# Patient Record
Sex: FEMALE | Race: WHITE | NOT HISPANIC OR LATINO | Employment: PART TIME | URBAN - METROPOLITAN AREA
[De-identification: names, ages, dates, MRNs, and addresses within clinical notes are randomized per-mention and may not be internally consistent; named-entity substitution may affect disease eponyms.]

---

## 2017-01-19 ENCOUNTER — APPOINTMENT (EMERGENCY)
Dept: RADIOLOGY | Facility: HOSPITAL | Age: 37
End: 2017-01-19
Payer: COMMERCIAL

## 2017-01-19 ENCOUNTER — HOSPITAL ENCOUNTER (EMERGENCY)
Facility: HOSPITAL | Age: 37
Discharge: HOME | End: 2017-01-19
Admitting: EMERGENCY MEDICINE
Payer: COMMERCIAL

## 2017-01-19 VITALS
HEIGHT: 68 IN | HEART RATE: 92 BPM | WEIGHT: 127 LBS | RESPIRATION RATE: 16 BRPM | SYSTOLIC BLOOD PRESSURE: 120 MMHG | TEMPERATURE: 98.8 F | OXYGEN SATURATION: 99 % | BODY MASS INDEX: 19.25 KG/M2 | DIASTOLIC BLOOD PRESSURE: 61 MMHG

## 2017-01-19 DIAGNOSIS — M25.469 SWELLING OF KNEE: Primary | ICD-10-CM

## 2017-01-19 PROCEDURE — 73560 X-RAY EXAM OF KNEE 1 OR 2: CPT

## 2017-01-19 PROCEDURE — 99283 EMERGENCY DEPT VISIT LOW MDM: CPT

## 2017-01-19 RX ORDER — NAPROXEN 500 MG/1
500 TABLET ORAL 2 TIMES DAILY WITH MEALS
Qty: 20 TABLET | Refills: 0 | Status: SHIPPED | OUTPATIENT
Start: 2017-01-19 | End: 2017-01-29

## 2017-05-04 ENCOUNTER — APPOINTMENT (EMERGENCY)
Dept: CT IMAGING | Facility: HOSPITAL | Age: 37
End: 2017-05-04
Payer: COMMERCIAL

## 2017-05-04 ENCOUNTER — HOSPITAL ENCOUNTER (EMERGENCY)
Facility: HOSPITAL | Age: 37
Discharge: HOME | End: 2017-05-05
Attending: EMERGENCY MEDICINE | Admitting: EMERGENCY MEDICINE
Payer: COMMERCIAL

## 2017-05-04 VITALS
DIASTOLIC BLOOD PRESSURE: 66 MMHG | RESPIRATION RATE: 18 BRPM | HEART RATE: 91 BPM | BODY MASS INDEX: 18.25 KG/M2 | SYSTOLIC BLOOD PRESSURE: 128 MMHG | TEMPERATURE: 97.6 F | OXYGEN SATURATION: 100 % | WEIGHT: 120 LBS

## 2017-05-04 DIAGNOSIS — M54.10 RADICULAR LOW BACK PAIN: ICD-10-CM

## 2017-05-04 DIAGNOSIS — M54.50 ACUTE LEFT-SIDED LOW BACK PAIN WITHOUT SCIATICA: Primary | ICD-10-CM

## 2017-05-04 DIAGNOSIS — R10.9 ABDOMINAL PAIN: ICD-10-CM

## 2017-05-04 LAB
BACTERIA UR QL AUTO: ABNORMAL /HPF
BILIRUB UR QL STRIP: NEGATIVE
CLARITY UR: CLEAR
COLOR UR: YELLOW
GLUCOSE UR STRIP-MCNC: NEGATIVE MG/DL
HGB UR QL STRIP.AUTO: ABNORMAL
KETONES UR STRIP-MCNC: NEGATIVE MG/DL
LEUKOCYTE ESTERASE UR QL STRIP: NEGATIVE
MUCOUS THREADS UR QL AUTO: ABNORMAL
NITRITE UR QL STRIP: NEGATIVE
NON-SQ EPI CELLS URNS QL MICRO: ABNORMAL /HPF
PH UR STRIP.AUTO: 5.5 [PH] (ref 4.5–8)
PROT UR STRIP-MCNC: NEGATIVE MG/DL
RBC #/AREA URNS AUTO: ABNORMAL /HPF
SP GR UR STRIP.AUTO: >=1.03 (ref 1–1.03)
UROBILINOGEN UR QL STRIP.AUTO: 0.2 E.U./DL
WBC #/AREA URNS AUTO: ABNORMAL /HPF

## 2017-05-04 PROCEDURE — 87086 URINE CULTURE/COLONY COUNT: CPT

## 2017-05-04 PROCEDURE — 96372 THER/PROPH/DIAG INJ SC/IM: CPT

## 2017-05-04 PROCEDURE — 74176 CT ABD & PELVIS W/O CONTRAST: CPT

## 2017-05-04 PROCEDURE — 81001 URINALYSIS AUTO W/SCOPE: CPT

## 2017-05-04 RX ORDER — KETOROLAC TROMETHAMINE 30 MG/ML
30 INJECTION, SOLUTION INTRAMUSCULAR; INTRAVENOUS ONCE
Status: COMPLETED | OUTPATIENT
Start: 2017-05-04 | End: 2017-05-04

## 2017-05-04 RX ORDER — KETOROLAC TROMETHAMINE 30 MG/ML
INJECTION, SOLUTION INTRAMUSCULAR; INTRAVENOUS
Status: COMPLETED
Start: 2017-05-04 | End: 2017-05-04

## 2017-05-04 RX ADMIN — KETOROLAC TROMETHAMINE 30 MG: 30 INJECTION, SOLUTION INTRAMUSCULAR at 23:02

## 2017-05-04 RX ADMIN — KETOROLAC TROMETHAMINE 30 MG: 30 INJECTION, SOLUTION INTRAMUSCULAR; INTRAVENOUS at 23:02

## 2017-05-05 PROCEDURE — 99284 EMERGENCY DEPT VISIT MOD MDM: CPT

## 2017-05-05 RX ORDER — METHOCARBAMOL 500 MG/1
500 TABLET, FILM COATED ORAL 3 TIMES DAILY
Qty: 20 TABLET | Refills: 0 | Status: SHIPPED | OUTPATIENT
Start: 2017-05-05 | End: 2017-08-03

## 2017-05-05 RX ORDER — NAPROXEN 500 MG/1
500 TABLET ORAL 2 TIMES DAILY WITH MEALS
Qty: 20 TABLET | Refills: 0 | Status: SHIPPED | OUTPATIENT
Start: 2017-05-05 | End: 2017-05-15

## 2017-05-05 RX ORDER — LIDOCAINE 50 MG/G
1 PATCH TOPICAL EVERY 24 HOURS
Qty: 15 PATCH | Refills: 0 | Status: SHIPPED | OUTPATIENT
Start: 2017-05-05 | End: 2017-08-03

## 2017-05-06 LAB — BACTERIA UR CULT: NORMAL

## 2017-08-03 ENCOUNTER — HOSPITAL ENCOUNTER (EMERGENCY)
Facility: HOSPITAL | Age: 37
Discharge: HOME | End: 2017-08-03
Attending: EMERGENCY MEDICINE | Admitting: EMERGENCY MEDICINE
Payer: COMMERCIAL

## 2017-08-03 VITALS
OXYGEN SATURATION: 98 % | TEMPERATURE: 97.8 F | WEIGHT: 125 LBS | HEART RATE: 63 BPM | DIASTOLIC BLOOD PRESSURE: 63 MMHG | RESPIRATION RATE: 14 BRPM | BODY MASS INDEX: 19.01 KG/M2 | SYSTOLIC BLOOD PRESSURE: 117 MMHG

## 2017-08-03 DIAGNOSIS — G43.909 MIGRAINE HEADACHE: Primary | ICD-10-CM

## 2017-08-03 LAB — HCG UR QL: NEGATIVE

## 2017-08-03 PROCEDURE — 81025 URINE PREGNANCY TEST: CPT | Performed by: EMERGENCY MEDICINE

## 2017-08-03 PROCEDURE — 99283 EMERGENCY DEPT VISIT LOW MDM: CPT

## 2017-08-03 PROCEDURE — 96372 THER/PROPH/DIAG INJ SC/IM: CPT

## 2017-08-03 RX ORDER — METOCLOPRAMIDE 10 MG/1
10 TABLET ORAL ONCE
Status: COMPLETED | OUTPATIENT
Start: 2017-08-03 | End: 2017-08-03

## 2017-08-03 RX ORDER — METOCLOPRAMIDE 10 MG/1
10 TABLET ORAL EVERY 6 HOURS PRN
Qty: 20 TABLET | Refills: 0 | Status: SHIPPED | OUTPATIENT
Start: 2017-08-03 | End: 2022-06-28

## 2017-08-03 RX ORDER — KETOROLAC TROMETHAMINE 30 MG/ML
60 INJECTION, SOLUTION INTRAMUSCULAR; INTRAVENOUS ONCE
Status: COMPLETED | OUTPATIENT
Start: 2017-08-03 | End: 2017-08-03

## 2017-08-03 RX ADMIN — METOCLOPRAMIDE HYDROCHLORIDE 10 MG: 10 TABLET ORAL at 22:40

## 2017-08-03 RX ADMIN — KETOROLAC TROMETHAMINE 60 MG: 30 INJECTION, SOLUTION INTRAMUSCULAR at 22:42

## 2017-08-03 RX ADMIN — DEXAMETHASONE SODIUM PHOSPHATE 10 MG: 10 INJECTION, SOLUTION INTRAMUSCULAR; INTRAVENOUS at 22:38

## 2018-08-15 PROBLEM — F17.200 CURRENT EVERY DAY SMOKER: Status: ACTIVE | Noted: 2018-08-15

## 2019-03-31 ENCOUNTER — OFFICE VISIT (OUTPATIENT)
Dept: URGENT CARE | Age: 39
End: 2019-03-31
Payer: COMMERCIAL

## 2019-03-31 VITALS
OXYGEN SATURATION: 96 % | WEIGHT: 148 LBS | DIASTOLIC BLOOD PRESSURE: 95 MMHG | TEMPERATURE: 97.9 F | RESPIRATION RATE: 18 BRPM | HEART RATE: 87 BPM | SYSTOLIC BLOOD PRESSURE: 122 MMHG | BODY MASS INDEX: 22.5 KG/M2

## 2019-03-31 DIAGNOSIS — J02.0 STREP THROAT: Primary | ICD-10-CM

## 2019-03-31 LAB — S PYO AG THROAT QL: NEGATIVE

## 2019-03-31 PROCEDURE — 99283 EMERGENCY DEPT VISIT LOW MDM: CPT | Performed by: PHYSICIAN ASSISTANT

## 2019-03-31 PROCEDURE — 99203 OFFICE O/P NEW LOW 30 MIN: CPT | Performed by: PHYSICIAN ASSISTANT

## 2019-03-31 PROCEDURE — 87070 CULTURE OTHR SPECIMN AEROBIC: CPT | Performed by: PHYSICIAN ASSISTANT

## 2019-03-31 PROCEDURE — 87430 STREP A AG IA: CPT | Performed by: PHYSICIAN ASSISTANT

## 2019-03-31 PROCEDURE — G0382 LEV 3 HOSP TYPE B ED VISIT: HCPCS | Performed by: PHYSICIAN ASSISTANT

## 2019-03-31 RX ORDER — AZITHROMYCIN 250 MG/1
TABLET, FILM COATED ORAL
Qty: 6 TABLET | Refills: 0 | Status: SHIPPED | OUTPATIENT
Start: 2019-03-31 | End: 2019-04-04

## 2019-03-31 RX ORDER — PAROXETINE HYDROCHLORIDE 20 MG/1
TABLET, FILM COATED ORAL
Refills: 1 | COMMUNITY
Start: 2019-03-20

## 2019-03-31 RX ORDER — MIRTAZAPINE 15 MG/1
TABLET, FILM COATED ORAL
Refills: 1 | COMMUNITY
Start: 2019-03-20

## 2019-03-31 NOTE — PATIENT INSTRUCTIONS
Take medications as directed  Drink plenty of fluids  Follow up with family doctor this week  Go to ER immediately if new or worsening symptoms occur  Strep Throat   WHAT YOU NEED TO KNOW:   Strep throat is a throat infection caused by bacteria  It is easily spread from person to person  DISCHARGE INSTRUCTIONS:   Call 911 for any of the following:   · You have trouble breathing  Return to the emergency department if:   · You have new symptoms like a bad headache, stiff neck, chest pain, or vomiting  · You are drooling because you cannot swallow your spit  Contact your healthcare provider if:   · You have a fever  · You have a rash or ear pain  · You have green, yellow-brown, or bloody mucus when you cough or blow your nose  · You are unable to drink anything  · You have questions or concerns about your condition or care  Medicines:   · Antibiotics  help treat your strep throat  You should feel better within 2 to 3 days after you start antibiotics  · Take your medicine as directed  Contact your healthcare provider if you think your medicine is not helping or if you have side effects  Tell him or her if you are allergic to any medicine  Keep a list of the medicines, vitamins, and herbs you take  Include the amounts, and when and why you take them  Bring the list or the pill bottles to follow-up visits  Carry your medicine list with you in case of an emergency  Manage your symptoms:   · Use lozenges, ice, soft foods, or popsicles  to soothe your throat  · Drink juice, milk shakes, or soup  if your throat is too sore to eat solid food  Drinking liquids can also help prevent dehydration  · Gargle with salt water  Mix ¼ teaspoon salt in a glass of warm water and gargle  This may help reduce swelling in your throat  · Do not smoke  Nicotine and other chemicals in cigarettes and cigars can cause lung damage and make your symptoms worse   Ask your healthcare provider for information if you currently smoke and need help to quit  E-cigarettes or smokeless tobacco still contain nicotine  Talk to your healthcare provider before you use these products  Return to work or school  24 hours after you start antibiotic medicine  Prevent the spread of strep throat:   · Wash your hands often  Use soap and water  Wash your hands after you use the bathroom, change a child's diapers, or sneeze  Wash your hands before you prepare or eat food  · Do not share food or drinks  Replace your toothbrush after you have taken antibiotics for 24 hours  Follow up with your healthcare provider as directed:  Write down your questions so you remember to ask them during your visits  © 2017 [employer] Information is for End User's use only and may not be sold, redistributed or otherwise used for commercial purposes  All illustrations and images included in CareNotes® are the copyrighted property of [employer]  or David Khanna  The above information is an  only  It is not intended as medical advice for individual conditions or treatments  Talk to your doctor, nurse or pharmacist before following any medical regimen to see if it is safe and effective for you

## 2019-03-31 NOTE — PROGRESS NOTES
[facility]        NAME: Laura Crum is a 35 y o  female  : 1980    MRN: [id number]  DATE: 2019  TIME: 3:56 PM    Assessment and Plan   Strep throat [J02 0]  1  Strep throat  POCT rapid strepA    Throat culture    azithromycin (ZITHROMAX) 250 mg tablet         Patient Instructions       Take medications as directed  Drink plenty of fluids  Follow up with family doctor this week  Go to ER immediately if new or worsening symptoms occur  Chief Complaint     Chief Complaint   Patient presents with    Sore Throat      couple of weeks; no fevers         History of Present Illness       Sore Throat    This is a new problem  Episode onset: Started 3 weeks ago, however in the past 2-3 days it has significantly worsened  Patient thought it was allergies at 1st, however now the pain is very bad  The problem has been gradually worsening  There has been no fever  The pain is at a severity of 7/10  Associated symptoms include swollen glands  Pertinent negatives include no congestion, coughing, diarrhea, ear pain, headaches, hoarse voice, neck pain, shortness of breath, stridor, trouble swallowing or vomiting  She has had no exposure to strep or mono  Treatments tried: honey and lemon  Review of Systems   Review of Systems   Constitutional: Negative for chills, diaphoresis, fatigue and fever  HENT: Positive for postnasal drip, sinus pressure and sore throat  Negative for congestion, ear pain, hoarse voice, rhinorrhea, sinus pain, sneezing, trouble swallowing and voice change  Eyes: Negative  Respiratory: Negative for cough, chest tightness, shortness of breath, wheezing and stridor  Cardiovascular: Negative for chest pain and palpitations  Gastrointestinal: Negative for constipation, diarrhea, nausea and vomiting  Endocrine: Negative  Genitourinary: Negative for dysuria  Musculoskeletal: Negative for back pain, myalgias and neck pain     Skin: Negative for pallor and rash  Allergic/Immunologic: Negative  Neurological: Negative for dizziness, syncope and headaches  Hematological: Negative  Psychiatric/Behavioral: Negative  Current Medications       Current Outpatient Medications:     azithromycin (ZITHROMAX) 250 mg tablet, Take 2 tablets today then 1 tablet daily x 4 days, Disp: 6 tablet, Rfl: 0    metoclopramide (REGLAN) 10 mg tablet, Take 1 tablet by mouth every 6 (six) hours as needed (headache, nausea) (Patient not taking: Reported on 3/31/2019), Disp: 20 tablet, Rfl: 0    mirtazapine (REMERON) 15 mg tablet, , Disp: , Rfl: 1    naproxen (NAPROSYN) 500 mg tablet, Take 1 tablet by mouth 2 (two) times a day with meals for 10 days, Disp: 20 tablet, Rfl: 0    PARoxetine (PAXIL) 20 mg tablet, , Disp: , Rfl: 1    Current Allergies     Allergies as of 03/31/2019 - Reviewed 03/31/2019   Allergen Reaction Noted    Penicillins Rash 03/30/2016            The following portions of the patient's history were reviewed and updated as appropriate: allergies, current medications, past family history, past medical history, past social history, past surgical history and problem list      Past Medical History:   Diagnosis Date    Cigarette smoker     [facility]     [facility] mellitus [facility]     Graves disease     Graves disease     Hypertension        Past Surgical History:   Procedure Laterality Date    TUBAL LIGATION         History reviewed  No pertinent family history  Medications have been verified  Objective   /95   Pulse 87   Temp 97 9 °F (36 6 °C)   Resp 18   Wt 67 1 kg (148 lb)   LMP 02/21/2019   SpO2 96%   BMI 22 50 kg/m²        Physical Exam     Physical Exam   Constitutional: She appears well-developed and well-nourished  No distress  HENT:   Head: Normocephalic and atraumatic     Right Ear: Hearing, tympanic membrane, external ear and ear canal normal    Left Ear: Hearing, tympanic membrane, external ear and ear canal normal    Nose: No mucosal edema or rhinorrhea  Mouth/Throat: Oropharyngeal exudate and posterior oropharyngeal erythema present  No posterior oropharyngeal edema or tonsillar abscesses  Eyes: Conjunctivae are normal  Right eye exhibits no discharge  Left eye exhibits no discharge  Neck: Normal range of motion  Neck supple  Cardiovascular: Normal rate, regular rhythm, normal heart sounds and intact distal pulses  Pulmonary/Chest: Effort normal and breath sounds normal  No respiratory distress  She has no wheezes  She has no rales  Lymphadenopathy:     She has no cervical adenopathy  Skin: Skin is warm  No rash noted  She is not diaphoretic  Nursing note and vitals reviewed

## 2019-04-02 LAB — BACTERIA THROAT CULT: NORMAL

## 2022-01-08 ENCOUNTER — APPOINTMENT (EMERGENCY)
Dept: RADIOLOGY | Facility: HOSPITAL | Age: 42
End: 2022-01-08
Payer: COMMERCIAL

## 2022-01-08 ENCOUNTER — HOSPITAL ENCOUNTER (EMERGENCY)
Facility: HOSPITAL | Age: 42
Discharge: HOME | End: 2022-01-08
Attending: EMERGENCY MEDICINE | Admitting: EMERGENCY MEDICINE
Payer: COMMERCIAL

## 2022-01-08 VITALS
OXYGEN SATURATION: 97 % | DIASTOLIC BLOOD PRESSURE: 69 MMHG | TEMPERATURE: 98.5 F | SYSTOLIC BLOOD PRESSURE: 144 MMHG | HEART RATE: 96 BPM | RESPIRATION RATE: 16 BRPM

## 2022-01-08 DIAGNOSIS — S83.92XA LEFT KNEE SPRAIN: Primary | ICD-10-CM

## 2022-01-08 PROCEDURE — 99283 EMERGENCY DEPT VISIT LOW MDM: CPT

## 2022-01-08 PROCEDURE — 73564 X-RAY EXAM KNEE 4 OR MORE: CPT

## 2022-01-08 PROCEDURE — 99285 EMERGENCY DEPT VISIT HI MDM: CPT | Performed by: PHYSICIAN ASSISTANT

## 2022-01-08 RX ORDER — NAPROXEN 500 MG/1
500 TABLET ORAL 2 TIMES DAILY WITH MEALS
Qty: 14 TABLET | Refills: 0 | Status: SHIPPED | OUTPATIENT
Start: 2022-01-08 | End: 2022-06-28

## 2022-01-09 NOTE — ED PROVIDER NOTES
History  Chief Complaint   Patient presents with    Knee Pain     Pt presents to the ED s/p fall onset x 1 day  Reports stepping down off a chair when she twisted and fell onto her left knee  Denies prior surgical hx to affected knee  History provided by:  Patient  Knee Pain  Location:  Knee  Injury: yes    Mechanism of injury: fall    Fall:     Fall occurred: fwll from chair  Impact surface:  Hard floor    Point of impact: left knee  Entrapped after fall: no    Knee location:  L knee  Pain details:     Quality:  Aching    Radiates to:  Does not radiate    Severity:  Moderate    Onset quality:  Sudden    Duration:  1 day  Chronicity:  New  Dislocation: no    Prior injury to area:  No  Relieved by:  Rest  Worsened by:  Bearing weight  Ineffective treatments:  None tried  Associated symptoms: decreased ROM    Associated symptoms: no back pain, no fatigue, no fever, no itching, no muscle weakness, no neck pain, no numbness, no stiffness, no swelling and no tingling    Risk factors: no concern for non-accidental trauma, no frequent fractures, no known bone disorder, no obesity and no recent illness        Prior to Admission Medications   Prescriptions Last Dose Informant Patient Reported? Taking? PARoxetine (PAXIL) 20 mg tablet   Yes No   metoclopramide (REGLAN) 10 mg tablet   No No   Sig: Take 1 tablet by mouth every 6 (six) hours as needed (headache, nausea)   Patient not taking: Reported on 3/31/2019   mirtazapine (REMERON) 15 mg tablet   Yes No   naproxen (NAPROSYN) 500 mg tablet   No No   Sig: Take 1 tablet by mouth 2 (two) times a day with meals for 10 days      Facility-Administered Medications: None       Past Medical History:   Diagnosis Date    Cigarette smoker     [facility]     [facility]     Graves disease     Graves disease     Hypertension        Past Surgical History:   Procedure Laterality Date    TUBAL LIGATION         History reviewed   No pertinent family history  I have reviewed and agree with the history as documented  E-Cigarette/Vaping     E-Cigarette/Vaping Substances     Social History     Tobacco Use    Smoking status: Current Every Day Smoker     Packs/day: 0 50    Smokeless tobacco: Never Used   Substance Use Topics    Alcohol use: No     Comment: socially    Drug use: No       Review of Systems   Constitutional: Negative for activity change, appetite change, fatigue and fever  Musculoskeletal: Positive for arthralgias, gait problem and joint swelling  Negative for back pain, neck pain and stiffness  Skin: Negative for color change, itching and wound  All other systems reviewed and are negative  Physical Exam  Physical Exam  Vitals and nursing note reviewed  Constitutional:       General: She is not in acute distress  Appearance: Normal appearance  She is normal weight  She is not ill-appearing, toxic-appearing or diaphoretic  HENT:      Head: Normocephalic and atraumatic  Eyes:      General:         Right eye: No discharge  Left eye: No discharge  Conjunctiva/sclera: Conjunctivae normal    Pulmonary:      Effort: Pulmonary effort is normal    Musculoskeletal:      Cervical back: Neck supple  Comments: Inspection of the left knee-it is atraumatic upon inspection  The legs are aligned  There is no tenderness with ballottement of the patella  There is negative patellar grind and inhibition sign  There is some tenderness palpated over the medial retinaculum  There is no point medial joint line tenderness present  Collateral ligaments are intact  No instability of the medial collateral ligament  There is negative Lachman's sign  There is good range of motion of the left hip in all planes   Skin:     General: Skin is warm  Capillary Refill: Capillary refill takes less than 2 seconds  Neurological:      Mental Status: She is alert     Psychiatric:         Mood and Affect: Mood normal  Behavior: Behavior normal          Thought Content: Thought content normal          Judgment: Judgment normal          Vital Signs  ED Triage Vitals [01/08/22 1832]   Temperature Pulse Respirations Blood Pressure SpO2   98 5 °F (36 9 °C) 96 16 144/69 97 %      Temp Source Heart Rate Source Patient Position - Orthostatic VS BP Location FiO2 (%)   Oral Monitor Sitting Left arm --      Pain Score       6           Vitals:    01/08/22 1832   BP: 144/69   Pulse: 96   Patient Position - Orthostatic VS: Sitting         Visual Acuity      ED Medications  Medications - No data to display    Diagnostic Studies  Results Reviewed     None                 XR knee 4+ vw left injury   ED Interpretation by Nan Chatman PA-C (01/08 1929)   No fracture                 Procedures  Procedures         ED Course                                             MDM  Number of Diagnoses or Management Options  Left knee sprain: new and requires workup  Diagnosis management comments: Medical decision making-    Patient presented to the emergency room 1 day after falling off a chair in injuring her left knee  She landed directly on her left knee  She complains of pain over the medial aspect of her knee  The pain is worse with ambulation  She is not having difficulty flexing or extending her knee  She denies any popping, locking, giving way  She was seen and examined -the knee was atraumatic upon inspection  There is tenderness palpated over the medial retinaculum  There was no tenderness with ballottement of the patella  The patient and negative patellar grind and inhibition sign  Her collateral ligaments were intact  She had a negative Lachman's sign  It    X-ray of the left knee demonstrated no acute bony abnormality  Impression-acute sprain left knee    Plan-the patient was given crutches to assist her with ambulation  She was given an orthopedic referral for follow-up    She was given a prescription for Naprosyn to take 500 mg twice daily as needed for pain  She was dispense 14 tablets  She is to take the medication with food  Amount and/or Complexity of Data Reviewed  Tests in the radiology section of CPT®: ordered and reviewed  Tests in the medicine section of CPT®: ordered and reviewed    Risk of Complications, Morbidity, and/or Mortality  Presenting problems: moderate  Diagnostic procedures: moderate  Management options: moderate    Patient Progress  Patient progress: stable      Disposition  Final diagnoses:   Left knee sprain     Time reflects when diagnosis was documented in both MDM as applicable and the Disposition within this note     Time User Action Codes Description Comment    1/8/2022  7:30 PM Lisbet Cates Add Lorene Fine  92XA] Left knee sprain       ED Disposition     ED Disposition Condition Date/Time Comment    Discharge Stable Sat Jan 8, 2022  7:30 PM Winter Younger discharge to home/self care  Follow-up Information     Follow up With Specialties Details Why [address], MD Orthopedic Surgery   [address] [facility] [address] [phone]            Discharge Medication List as of 1/8/2022  7:33 PM      CONTINUE these medications which have CHANGED    Details   naproxen (NAPROSYN) 500 mg tablet Take 1 tablet (500 mg total) by mouth 2 (two) times a day with meals, Starting Sat 1/8/2022, Normal         CONTINUE these medications which have NOT CHANGED    Details   metoclopramide (REGLAN) 10 mg tablet Take 1 tablet by mouth every 6 (six) hours as needed (headache, nausea), Starting Thu 8/3/2017, Print      mirtazapine (REMERON) 15 mg tablet Starting Wed 3/20/2019, Historical Med      PARoxetine (PAXIL) 20 mg tablet Starting Wed 3/20/2019, Historical Med             No discharge procedures on file      PDMP Review     None          ED Provider  Electronically Signed by           Leona Talamantes PA-C  01/08/22 2044

## 2022-06-23 ENCOUNTER — HOSPITAL ENCOUNTER (EMERGENCY)
Facility: HOSPITAL | Age: 42
Discharge: HOME | End: 2022-06-24
Attending: EMERGENCY MEDICINE
Payer: COMMERCIAL

## 2022-06-23 ENCOUNTER — APPOINTMENT (EMERGENCY)
Dept: CT IMAGING | Facility: HOSPITAL | Age: 42
End: 2022-06-23
Payer: COMMERCIAL

## 2022-06-23 DIAGNOSIS — N83.209 OVARIAN CYST: Primary | ICD-10-CM

## 2022-06-23 LAB
ALBUMIN SERPL BCP-MCNC: 3.8 G/DL (ref 3.5–5)
ALP SERPL-CCNC: 65 U/L (ref 34–104)
ALT SERPL W P-5'-P-CCNC: 21 U/L (ref 7–52)
ANION GAP SERPL CALCULATED.3IONS-SCNC: 8 MMOL/L (ref 4–13)
AST SERPL W P-5'-P-CCNC: 17 U/L (ref 13–39)
BASOPHILS # BLD AUTO: 0.02 THOUSANDS/ΜL (ref 0–0.1)
BASOPHILS NFR BLD AUTO: 0 % (ref 0–1)
BILIRUB SERPL-MCNC: 0.23 MG/DL (ref 0.2–1)
BUN SERPL-MCNC: 11 MG/DL (ref 5–25)
CALCIUM SERPL-MCNC: 9.4 MG/DL (ref 8.4–10.2)
CHLORIDE SERPL-SCNC: 103 MMOL/L (ref 96–108)
CO2 SERPL-SCNC: 25 MMOL/L (ref 21–32)
CREAT SERPL-MCNC: 0.74 MG/DL (ref 0.6–1.3)
EOSINOPHIL # BLD AUTO: 0.12 THOUSAND/ΜL (ref 0–0.61)
EOSINOPHIL NFR BLD AUTO: 2 % (ref 0–6)
ERYTHROCYTE [DISTWIDTH] IN BLOOD BY AUTOMATED COUNT: 13.1 % (ref 11.6–15.1)
GFR SERPL CREATININE-BSD FRML MDRD: 99 ML/MIN/1.73SQ M
GLUCOSE SERPL-MCNC: 93 MG/DL (ref 65–140)
HCT VFR BLD AUTO: 41.5 % (ref 34.8–46.1)
HGB BLD-MCNC: 14 G/DL (ref 11.5–15.4)
IMM GRANULOCYTES # BLD AUTO: 0.03 THOUSAND/UL (ref 0–0.2)
IMM GRANULOCYTES NFR BLD AUTO: 0 % (ref 0–2)
LACTATE SERPL-SCNC: 1.1 MMOL/L (ref 0.5–2)
LIPASE SERPL-CCNC: 28 U/L (ref 11–82)
LYMPHOCYTES # BLD AUTO: 2.24 THOUSANDS/ΜL (ref 0.6–4.47)
LYMPHOCYTES NFR BLD AUTO: 28 % (ref 14–44)
MCH RBC QN AUTO: 33 PG (ref 26.8–34.3)
MCHC RBC AUTO-ENTMCNC: 33.7 G/DL (ref 31.4–37.4)
MCV RBC AUTO: 98 FL (ref 82–98)
MONOCYTES # BLD AUTO: 0.56 THOUSAND/ΜL (ref 0.17–1.22)
MONOCYTES NFR BLD AUTO: 7 % (ref 4–12)
NEUTROPHILS # BLD AUTO: 4.97 THOUSANDS/ΜL (ref 1.85–7.62)
NEUTS SEG NFR BLD AUTO: 63 % (ref 43–75)
NRBC BLD AUTO-RTO: 0 /100 WBCS
PLATELET # BLD AUTO: 349 THOUSANDS/UL (ref 149–390)
PMV BLD AUTO: 9.8 FL (ref 8.9–12.7)
POTASSIUM SERPL-SCNC: 3.9 MMOL/L (ref 3.5–5.3)
PROT SERPL-MCNC: 6.5 G/DL (ref 6.4–8.4)
RBC # BLD AUTO: 4.24 MILLION/UL (ref 3.81–5.12)
SODIUM SERPL-SCNC: 136 MMOL/L (ref 135–147)
WBC # BLD AUTO: 7.94 THOUSAND/UL (ref 4.31–10.16)

## 2022-06-23 PROCEDURE — 87086 URINE CULTURE/COLONY COUNT: CPT | Performed by: PHYSICIAN ASSISTANT

## 2022-06-23 PROCEDURE — 81001 URINALYSIS AUTO W/SCOPE: CPT | Performed by: PHYSICIAN ASSISTANT

## 2022-06-23 PROCEDURE — 99284 EMERGENCY DEPT VISIT MOD MDM: CPT | Performed by: PHYSICIAN ASSISTANT

## 2022-06-23 PROCEDURE — 83690 ASSAY OF LIPASE: CPT | Performed by: PHYSICIAN ASSISTANT

## 2022-06-23 PROCEDURE — 36415 COLL VENOUS BLD VENIPUNCTURE: CPT | Performed by: PHYSICIAN ASSISTANT

## 2022-06-23 PROCEDURE — 87147 CULTURE TYPE IMMUNOLOGIC: CPT | Performed by: PHYSICIAN ASSISTANT

## 2022-06-23 PROCEDURE — 85025 COMPLETE CBC W/AUTO DIFF WBC: CPT | Performed by: PHYSICIAN ASSISTANT

## 2022-06-23 PROCEDURE — G1004 CDSM NDSC: HCPCS

## 2022-06-23 PROCEDURE — 83605 ASSAY OF LACTIC ACID: CPT | Performed by: PHYSICIAN ASSISTANT

## 2022-06-23 PROCEDURE — 74176 CT ABD & PELVIS W/O CONTRAST: CPT

## 2022-06-23 PROCEDURE — 99284 EMERGENCY DEPT VISIT MOD MDM: CPT

## 2022-06-23 PROCEDURE — 80053 COMPREHEN METABOLIC PANEL: CPT | Performed by: PHYSICIAN ASSISTANT

## 2022-06-24 VITALS
TEMPERATURE: 98.2 F | OXYGEN SATURATION: 97 % | DIASTOLIC BLOOD PRESSURE: 62 MMHG | SYSTOLIC BLOOD PRESSURE: 109 MMHG | HEART RATE: 58 BPM | RESPIRATION RATE: 18 BRPM

## 2022-06-24 LAB
BACTERIA UR QL AUTO: NORMAL /HPF
BILIRUB UR QL STRIP: NEGATIVE
CLARITY UR: CLEAR
COLOR UR: ABNORMAL
GLUCOSE UR STRIP-MCNC: NEGATIVE MG/DL
HGB UR QL STRIP.AUTO: ABNORMAL
KETONES UR STRIP-MCNC: NEGATIVE MG/DL
LEUKOCYTE ESTERASE UR QL STRIP: NEGATIVE
NITRITE UR QL STRIP: NEGATIVE
NON-SQ EPI CELLS URNS QL MICRO: NORMAL /HPF
PH UR STRIP.AUTO: 6.5 [PH]
PROT UR STRIP-MCNC: NEGATIVE MG/DL
RBC #/AREA URNS AUTO: NORMAL /HPF
SP GR UR STRIP.AUTO: <=1.005 (ref 1–1.03)
UROBILINOGEN UR QL STRIP.AUTO: 0.2 E.U./DL
WBC #/AREA URNS AUTO: NORMAL /HPF

## 2022-06-24 NOTE — DISCHARGE INSTRUCTIONS
Tylenol or Motrin for pain  Follow up with OBGYN for outpatient ultrasouns  Return to the ER if anything acutely changes

## 2022-06-24 NOTE — ED PROVIDER NOTES
History  Chief Complaint   Patient presents with    Post-op Problem     Patient states that since her hysterectomy on May 10th she has been getting excruciating pain in her lower abdomen  This pain is periodic and 10/10 when present  Patient did have a follow up on June 10th and was assured that all looked ok  Patient is a 36 YOF presenting to the ER for evaluation of intermittent lower abdominal pain  She states she had a hysterectomy and bilateral salpingectomy on 5/10  She states since then she has had intermittent lower abdominal pain  States it is sharp in nature  It comes and goes  It is usually relieved with having a bowel movement  However, today she did not have a BM and the pain occurred 3 times  She states it lasts a couple of seconds then resolves  No radiation of the pain  She denies any current pain  She denies vaginal bleeding, vaginal discharge, nausea, vomiting, diarrhea, chest pain, shortness of breath, or any other symptoms at this time  History provided by:  Patient   used: No        Prior to Admission Medications   Prescriptions Last Dose Informant Patient Reported? Taking? PARoxetine (PAXIL) 20 mg tablet   Yes No   metoclopramide (REGLAN) 10 mg tablet   No No   Sig: Take 1 tablet by mouth every 6 (six) hours as needed (headache, nausea)   Patient not taking: Reported on 3/31/2019   mirtazapine (REMERON) 15 mg tablet   Yes No   naproxen (NAPROSYN) 500 mg tablet   No No   Sig: Take 1 tablet (500 mg total) by mouth 2 (two) times a day with meals      Facility-Administered Medications: None       Past Medical History:   Diagnosis Date    Cigarette smoker     [facility]     Diabetes mellitus ([facility]     Graves disease     Graves disease     Hypertension        Past Surgical History:   Procedure Laterality Date    HYSTERECTOMY      TUBAL LIGATION         History reviewed  No pertinent family history    I have reviewed and agree with the history as documented  E-Cigarette/Vaping     E-Cigarette/Vaping Substances     Social History     Tobacco Use    Smoking status: Current Every Day Smoker     Packs/day: 0 50    Smokeless tobacco: Never Used   Substance Use Topics    Alcohol use: No     Comment: socially    Drug use: No       Review of Systems   Constitutional: Negative for chills and fever  HENT: Negative for ear pain and sore throat  Eyes: Negative for pain and visual disturbance  Respiratory: Negative for cough and shortness of breath  Cardiovascular: Negative for chest pain and palpitations  Gastrointestinal: Negative for abdominal pain and vomiting  Genitourinary: Positive for pelvic pain  Negative for dysuria and hematuria  Musculoskeletal: Negative for arthralgias and back pain  Skin: Negative for color change and rash  Neurological: Negative for seizures and syncope  All other systems reviewed and are negative  Physical Exam  Physical Exam  Vitals and nursing note reviewed  Constitutional:       General: She is not in acute distress  Appearance: She is well-developed  HENT:      Head: Normocephalic and atraumatic  Eyes:      Conjunctiva/sclera: Conjunctivae normal    Cardiovascular:      Rate and Rhythm: Normal rate and regular rhythm  Heart sounds: No murmur heard  Pulmonary:      Effort: Pulmonary effort is normal  No respiratory distress  Breath sounds: Normal breath sounds  Abdominal:      Palpations: Abdomen is soft  Tenderness: There is no abdominal tenderness  Musculoskeletal:      Cervical back: Neck supple  Skin:     General: Skin is warm and dry  Neurological:      Mental Status: She is alert           Vital Signs  ED Triage Vitals [06/23/22 2212]   Temperature Pulse Respirations Blood Pressure SpO2   98 2 °F (36 8 °C) 74 18 122/75 96 %      Temp Source Heart Rate Source Patient Position - Orthostatic VS BP Location FiO2 (%)   Oral Monitor Sitting Right arm -- Pain Score       8           Vitals:    06/23/22 2212 06/24/22 0034   BP: 122/75 109/62   Pulse: 74 58   Patient Position - Orthostatic VS: Sitting Lying         ED Medications  Medications - No data to display    Diagnostic Studies  Results Reviewed     Procedure Component Value Units Date/Time    Urine Microscopic [[id number]]  (Normal) Collected: 06/23/22 2344    Lab Status: Final result Specimen: Urine, Clean Catch Updated: 06/24/22 0017     RBC, UA 0-1 /hpf      WBC, UA 0-1 /hpf      Epithelial Cells Occasional /hpf      Bacteria, UA Occasional /hpf     UA (URINE) with reflex to Scope [[id number]]  (Abnormal) Collected: 06/23/22 2344    Lab Status: Final result Specimen: Urine, Clean Catch Updated: 06/24/22 0003     Color, UA Light Yellow     Clarity, UA Clear     Specific Gravity, UA <=1 005     pH, UA 6 5     Leukocytes, UA Negative     Nitrite, UA Negative     Protein, UA Negative mg/dl      Glucose, UA Negative mg/dl      Ketones, UA Negative mg/dl      Urobilinogen, UA 0 2 E U /dl      Bilirubin, UA Negative     Occult Blood, UA Trace-lysed    Urine culture [[id number]] Collected: 06/23/22 2344    Lab Status: In process Specimen: Urine, Clean Catch Updated: 06/23/22 2359    Lactic acid, plasma [[id number]]  (Normal) Collected: 06/23/22 2300    Lab Status: Final result Specimen: Blood from Arm, Left Updated: 06/23/22 2327     LACTIC ACID 1 1 mmol/L     Narrative:      Result may be elevated if tourniquet was used during collection      Comprehensive metabolic panel [[id number]] Collected: 06/23/22 2300    Lab Status: Final result Specimen: Blood from Arm, Left Updated: 06/23/22 2326     Sodium 136 mmol/L      Potassium 3 9 mmol/L      Chloride 103 mmol/L      CO2 25 mmol/L      ANION GAP 8 mmol/L      BUN 11 mg/dL      Creatinine 0 74 mg/dL      Glucose 93 mg/dL      Calcium 9 4 mg/dL      AST 17 U/L      ALT 21 U/L      Alkaline Phosphatase 65 U/L      Total Protein 6 5 g/dL      Albumin 3 8 g/dL      Total Bilirubin 0 23 mg/dL      eGFR 99 ml/min/1 73sq m     Narrative:      National Kidney Disease Foundation guidelines for Chronic Kidney Disease (CKD):     Stage 1 with normal or high GFR (GFR > 90 mL/min/1 73 square meters)    Stage 2 Mild CKD (GFR = 60-89 mL/min/1 73 square meters)    Stage 3A Moderate CKD (GFR = 45-59 mL/min/1 73 square meters)    Stage 3B Moderate CKD (GFR = 30-44 mL/min/1 73 square meters)    Stage 4 Severe CKD (GFR = 15-29 mL/min/1 73 square meters)    Stage 5 End Stage CKD (GFR <15 mL/min/1 73 square meters)  Note: GFR calculation is accurate only with a steady state creatinine    Lipase [[id number]]  (Normal) Collected: 06/23/22 2300    Lab Status: Final result Specimen: Blood from Arm, Left Updated: 06/23/22 2326     Lipase 28 u/L     CBC and differential [[id number]] Collected: 06/23/22 2300    Lab Status: Final result Specimen: Blood from Arm, Left Updated: 06/23/22 2307     WBC 7 94 Thousand/uL      RBC 4 24 Million/uL      Hemoglobin 14 0 g/dL      Hematocrit 41 5 %      MCV 98 fL      MCH 33 0 pg      MCHC 33 7 g/dL      RDW 13 1 %      MPV 9 8 fL      Platelets 768 Thousands/uL      nRBC 0 /100 WBCs      Neutrophils Relative 63 %      Immat GRANS % 0 %      Lymphocytes Relative 28 %      Monocytes Relative 7 %      Eosinophils Relative 2 %      Basophils Relative 0 %      Neutrophils Absolute 4 97 Thousands/µL      Immature Grans Absolute 0 03 Thousand/uL      Lymphocytes Absolute 2 24 Thousands/µL      Monocytes Absolute 0 56 Thousand/µL      Eosinophils Absolute 0 12 Thousand/µL      Basophils Absolute 0 02 Thousands/µL                  CT abdomen pelvis wo contrast   Final Result by Sudheer Weems MD (06/24 0000)      No acute intra-abdominal abnormality  No free air or free fluid  5 3 cm right ovarian cyst which does not appear entirely simple  A pelvic ultrasound is recommended for further characterization  Status post hysterectomy        Normal appendix visualized  Workstation performed: [id number]                      ED Course  ED Course as of 06/24/22 0155   Thu Jun 23, 2022   2250 Per Radiology, there is a national shortage of IV contrast, all CT scans are to be ordered without contrast at this time unless otherwise indicated by radiologist for emergent situations including stroke alerts, rule out aortic dissection, trauma alerts, or high suspicion for pulmonary embolism  Will order CT without contrast at this time  Fri Jun 24, 2022   0100 Patient has remained pain free while in the ER  Spoke with OBGYN and they state patient is OK to follow up as an outpatient for the TVUS  Patient's abdomen remains soft and non tender  All labs normal  Patient was given strict return precautions               MDM  Number of Diagnoses or Management Options  Ovarian cyst: new and requires workup  Diagnosis management comments: Patient presenting with intermittent lower abdominal pain since her hysterectomy/salpingectomy on 5/10  States the pain is sharp, comes and goes  No other symptoms  Patient is currently pain free  Remained pain free while in the ER  All labs normal  CT showing a complex ovarian cyst  Spoke with OBGYN and they said she can be seen as an outpatient for this  Patient's abdomen remained soft and non tender  She was advised to call her OBGYN for the TVUS  Tylenol or motrin for pain  Patient stable for discharge at this time  Patient explained warning signs and symptoms for ovarian torsion  Return to the ER if anything acutely changes          Amount and/or Complexity of Data Reviewed  Clinical lab tests: ordered and reviewed  Tests in the radiology section of CPT®: ordered and reviewed    Patient Progress  Patient progress: stable      Disposition  Final diagnoses:   Ovarian cyst     Time reflects when diagnosis was documented in both MDM as applicable and the Disposition within this note     Time User Action Codes Description Comment 6/24/2022 12:46 AM Teodoro Jimenezrebeccamira Add [G86 570] Ovarian cyst       ED Disposition     ED Disposition   Discharge    Condition   Stable    Date/Time   Fri Jun 24, 2022  1:18 AM    Comment   Winter Younger discharge to home/self care                 Follow-up Information     Follow up With Specialties Details Why Contact Info Additional Information    ITALIA Portillo Nurse Practitioner Schedule an appointment as soon as possible for a visit   [address] [facility]  [address] [place] [address]  [phone]       [address] [facility] Obstetrics and Gynecology Schedule an appointment as soon as possible for a visit   [address] [facility] [address] [facility] [facility], [address] [facility], [address] [facility] 107 Emergency Department Emergency Medicine  If symptoms worsen [address] [facility] [address] [facility] Emergency Department, [address], [place], [address] [facility], [address] Obstetrics and Gynecology Schedule an appointment as soon as possible for a visit   [address] [facility] [address] [facility] [address]  [facility], [address] [facility], [facility]          Discharge Medication List as of 6/24/2022  1:19 AM      CONTINUE these medications which have NOT CHANGED    Details   metoclopramide (REGLAN) 10 mg tablet Take 1 tablet by mouth every 6 (six) hours as needed (headache, nausea), Starting Thu 8/3/2017, Print      mirtazapine (REMERON) 15 mg tablet Starting Wed 3/20/2019, Historical Med      naproxen (NAPROSYN) 500 mg tablet Take 1 tablet (500 mg total) by mouth 2 (two) times a day with meals, Starting Sat 1/8/2022, Normal      PARoxetine (PAXIL) 20 mg tablet Starting Wed 3/20/2019, Historical Med             No discharge procedures on file      PDMP Review       Value Time User    PDMP Reviewed  Yes 6/23/2022 10:37 PM Maicol Lares MD          ED Provider  Electronically Signed by           Cindi Maguire PA-C  06/24/22 0155

## 2022-06-26 LAB
BACTERIA UR CULT: ABNORMAL
BACTERIA UR CULT: ABNORMAL

## 2022-06-28 ENCOUNTER — OFFICE VISIT (OUTPATIENT)
Dept: OBGYN CLINIC | Facility: CLINIC | Age: 42
End: 2022-06-28
Payer: COMMERCIAL

## 2022-06-28 VITALS
WEIGHT: 198 LBS | SYSTOLIC BLOOD PRESSURE: 120 MMHG | BODY MASS INDEX: 30.01 KG/M2 | DIASTOLIC BLOOD PRESSURE: 70 MMHG | HEIGHT: 68 IN

## 2022-06-28 DIAGNOSIS — N83.201 RIGHT OVARIAN CYST: Primary | ICD-10-CM

## 2022-06-28 PROBLEM — F41.9 ANXIETY: Status: ACTIVE | Noted: 2019-01-22

## 2022-06-28 PROBLEM — F41.8 DEPRESSION WITH ANXIETY: Status: ACTIVE | Noted: 2020-10-08

## 2022-06-28 PROBLEM — R87.610 ASCUS WITH POSITIVE HIGH RISK HPV CERVICAL: Status: ACTIVE | Noted: 2021-04-15

## 2022-06-28 PROBLEM — Z72.0 TOBACCO USE: Status: ACTIVE | Noted: 2017-09-11

## 2022-06-28 PROBLEM — G25.81 RESTLESS LEGS SYNDROME: Status: ACTIVE | Noted: 2019-04-19

## 2022-06-28 PROBLEM — F32.9 REACTIVE DEPRESSION (SITUATIONAL): Status: ACTIVE | Noted: 2019-01-22

## 2022-06-28 PROBLEM — N85.00 ENDOMETRIAL HYPERPLASIA WITHOUT ATYPIA: Status: ACTIVE | Noted: 2022-03-29

## 2022-06-28 PROBLEM — F51.04 PSYCHOPHYSIOLOGICAL INSOMNIA: Status: ACTIVE | Noted: 2019-01-22

## 2022-06-28 PROBLEM — G47.52 RBD (REM BEHAVIORAL DISORDER): Status: ACTIVE | Noted: 2022-02-04

## 2022-06-28 PROBLEM — R87.810 ASCUS WITH POSITIVE HIGH RISK HPV CERVICAL: Status: ACTIVE | Noted: 2021-04-15

## 2022-06-28 PROCEDURE — 99203 OFFICE O/P NEW LOW 30 MIN: CPT | Performed by: OBSTETRICS & GYNECOLOGY

## 2022-06-28 RX ORDER — ALBUTEROL SULFATE 90 UG/1
AEROSOL, METERED RESPIRATORY (INHALATION)
COMMUNITY
Start: 2022-05-11

## 2022-06-28 RX ORDER — LANOLIN ALCOHOL/MO/W.PET/CERES
1000 CREAM (GRAM) TOPICAL DAILY
COMMUNITY
Start: 2022-05-31

## 2022-06-28 RX ORDER — PAROXETINE 30 MG/1
TABLET, FILM COATED ORAL
COMMUNITY
Start: 2022-05-04

## 2022-06-28 RX ORDER — OMEPRAZOLE 20 MG/1
20 CAPSULE, DELAYED RELEASE ORAL DAILY
COMMUNITY
Start: 2022-06-03

## 2022-06-28 RX ORDER — GABAPENTIN 100 MG/1
300 CAPSULE ORAL
COMMUNITY
Start: 2022-02-23 | End: 2023-02-23

## 2022-06-28 RX ORDER — FERROUS SULFATE 325(65) MG
1 TABLET ORAL
COMMUNITY
Start: 2022-02-04

## 2022-06-28 RX ORDER — CETIRIZINE HYDROCHLORIDE 10 MG/1
TABLET ORAL
COMMUNITY
Start: 2022-05-04

## 2022-06-28 NOTE — PROGRESS NOTES
Assessment/Plan    Diagnoses and all orders for this visit:    Right ovarian cyst  -     US pelvis complete w transvaginal; Future  -  Pain improved since ER visit        Subjective  Chief Complaint   Patient presents with    Consult     Pt is here for a consult regarding ER follow up and ovarian cyst          Winter Younger is a 36 y o  female here for a problem visit  Patient is complaining of pelvic and abdominal pain since her robotic TLH/BS on May 10th  She did see her surgeon  who did a pelvic exam at that time and told her to f/u in a year  She was seen in the ER on  and had a CT scan done that showed a 5 3cm rigth ovarian cyst  Pain had come 3 separate times on Thursday, and then she decided to go to the ER  Pain lasted about 1-2 minutes and then resolved  She did not feel the need to have a BM  She was not happy with her care at [facility], and she does not want to return there for her care  Patient Active Problem List   Diagnosis    Current every day smoker    Anxiety    ASCUS with positive high risk HPV cervical    Depression with anxiety    Reactive depression (situational)    Endometrial hyperplasia without atypia    Psychophysiological insomnia    RBD (REM behavioral disorder)    Restless legs syndrome    Tobacco use         Gynecologic History  No LMP recorded  Patient has had an implant    Last Pap:  [facility]      Obstetric History  OB History    Para Term  AB Living   2 2 1 1   2   SAB IAB Ectopic Multiple Live Births           2      # Outcome Date GA Lbr Gutierrez/2nd Weight Sex Delivery Anes PTL Lv   2   36w0d   F Vag-Spont   HAYES   1 Term     F Vag-Spont   HAYES         Past Medical History:   Diagnosis Date    Cigarette smoker     [facility]     Diabetes mellitus [facility]     Graves disease     Graves disease     Hypertension        Past Surgical History:   Procedure Laterality Date    ROBOTIC ASSISTED HYSTERECTOMY Bilateral 05/10/2022    removal of both fallopian tubes and cervix    TUBAL LIGATION           History reviewed  No pertinent family history      Social History     Socioeconomic History    Marital status: Legally      Spouse name: Not on file    Number of children: 2    Years of education: Not on file    Highest education level: Not on file   Occupational History    Occupation:    Tobacco Use    Smoking status: Current Every Day Smoker     Packs/day: 1 00     Types: Cigarettes    Smokeless tobacco: Never Used   Vaping Use    Vaping Use: Never used   Substance and Sexual Activity    Alcohol use: No    Drug use: No    Sexual activity: Yes     Partners: Male     Birth control/protection: None   Other Topics Concern    Not on file   Social History Narrative    Not on file     Social Determinants of Health     Financial Resource Strain: Not on file   Food Insecurity: Not on file   Transportation Needs: Not on file   Physical Activity: Not on file   Stress: Not on file   Social Connections: Not on file   Intimate Partner Violence: Not on file   Housing Stability: Not on file       Allergies  Penicillins    Medications    Current Outpatient Medications:     albuterol (PROVENTIL HFA,VENTOLIN HFA) 90 mcg/act inhaler, INHALE 2 PUFFS EVERY 4 HOURS AS NEEDED FOR WHEEZING OR SHORTNESS OF BREATH, Disp: , Rfl:     cetirizine (ZyrTEC) 10 mg tablet, , Disp: , Rfl:     ferrous sulfate 325 (65 Fe) mg tablet, Take 1 tablet by mouth 3 (three) times a day with meals, Disp: , Rfl:     gabapentin (NEURONTIN) 100 mg capsule, Take 300 mg by mouth, Disp: , Rfl:     omeprazole (PriLOSEC) 20 mg delayed release capsule, Take 20 mg by mouth daily, Disp: , Rfl:     PARoxetine (PAXIL) 20 mg tablet, , Disp: , Rfl: 1    PARoxetine (PAXIL) 30 mg tablet, , Disp: , Rfl:     vitamin B-12 (VITAMIN B-12) 1,000 mcg tablet, Take 1,000 mcg by mouth daily, Disp: , Rfl:     mirtazapine (REMERON) 15 mg tablet, , Disp: , Rfl: 1      Review of Systems  Review of Systems   Constitutional: Negative for chills and fever  Gastrointestinal: Positive for abdominal pain  Negative for nausea and vomiting  Genitourinary: Positive for pelvic pain and vaginal discharge (mucous green discharge 2 weeks post op )  Negative for vaginal bleeding  Objective     /70 (BP Location: Right arm, Patient Position: Sitting, Cuff Size: Large)   Ht 5' 8" (1 727 m)   Wt 89 8 kg (198 lb)   BMI 30 11 kg/m²       Physical Exam  Constitutional:       General: She is not in acute distress  Appearance: Normal appearance  She is well-developed  She is not ill-appearing  Genitourinary:      Vulva normal       Right Labia: No rash, tenderness or lesions  Left Labia: No tenderness, lesions or rash  Vaginal cuff intact  Perineal sutures intact  No vaginal bleeding, ulceration, granulation tissue or cuff induration  No vaginal prolapse present  No vaginal atrophy present  Right Adnexa: tender and full  Left Adnexa: not tender, not full and no mass present  Pulmonary:      Effort: Pulmonary effort is normal  No respiratory distress  Neurological:      General: No focal deficit present  Mental Status: She is alert and oriented to person, place, and time  Psychiatric:         Mood and Affect: Mood normal          Behavior: Behavior normal          Thought Content: Thought content normal          Judgment: Judgment normal    Vitals and nursing note reviewed

## 2022-06-30 ENCOUNTER — HOSPITAL ENCOUNTER (OUTPATIENT)
Dept: ULTRASOUND IMAGING | Facility: HOSPITAL | Age: 42
Discharge: HOME | End: 2022-06-30
Attending: OBSTETRICS & GYNECOLOGY
Payer: COMMERCIAL

## 2022-06-30 DIAGNOSIS — N83.201 RIGHT OVARIAN CYST: ICD-10-CM

## 2022-06-30 PROCEDURE — 76830 TRANSVAGINAL US NON-OB: CPT

## 2022-06-30 PROCEDURE — 76856 US EXAM PELVIC COMPLETE: CPT

## 2022-07-05 ENCOUNTER — TELEPHONE (OUTPATIENT)
Dept: OBGYN CLINIC | Facility: CLINIC | Age: 42
End: 2022-07-05

## 2022-07-05 DIAGNOSIS — N83.201 RIGHT OVARIAN CYST: Primary | ICD-10-CM

## 2022-07-05 NOTE — TELEPHONE ENCOUNTER
Margy Fam, DO  You 14 minutes ago (2:57 PM)         Can you call patient? Simple cyst on ovary, can resolve on own, recommend repeat US in 8-12 weeks       Message text

## 2022-09-02 ENCOUNTER — HOSPITAL ENCOUNTER (OUTPATIENT)
Dept: ULTRASOUND IMAGING | Facility: HOSPITAL | Age: 42
Discharge: HOME | End: 2022-09-02
Attending: OBSTETRICS & GYNECOLOGY
Payer: COMMERCIAL

## 2022-09-02 DIAGNOSIS — N83.201 RIGHT OVARIAN CYST: ICD-10-CM

## 2022-09-02 PROCEDURE — 76830 TRANSVAGINAL US NON-OB: CPT

## 2022-09-02 PROCEDURE — 76856 US EXAM PELVIC COMPLETE: CPT

## 2023-01-18 ENCOUNTER — PREP FOR PROCEDURE (OUTPATIENT)
Dept: OBGYN CLINIC | Facility: CLINIC | Age: 43
End: 2023-01-18

## 2023-01-18 ENCOUNTER — APPOINTMENT (OUTPATIENT)
Dept: RADIOLOGY | Age: 43
End: 2023-01-18

## 2023-01-18 ENCOUNTER — OFFICE VISIT (OUTPATIENT)
Dept: PODIATRY | Facility: CLINIC | Age: 43
End: 2023-01-18

## 2023-01-18 VITALS
HEART RATE: 69 BPM | WEIGHT: 198 LBS | BODY MASS INDEX: 30.01 KG/M2 | DIASTOLIC BLOOD PRESSURE: 86 MMHG | SYSTOLIC BLOOD PRESSURE: 128 MMHG | HEIGHT: 68 IN

## 2023-01-18 DIAGNOSIS — M20.11 HALLUX ABDUCTO VALGUS, RIGHT: Primary | ICD-10-CM

## 2023-01-18 DIAGNOSIS — M20.11 ACQUIRED HALLUX VALGUS OF RIGHT FOOT: Primary | ICD-10-CM

## 2023-01-18 DIAGNOSIS — M21.619 BUNION: ICD-10-CM

## 2023-01-18 DIAGNOSIS — B35.3 TINEA PEDIS OF BOTH FEET: ICD-10-CM

## 2023-01-18 RX ORDER — CLOTRIMAZOLE 1 %
CREAM (GRAM) TOPICAL 2 TIMES DAILY
Qty: 60 G | Refills: 5 | Status: SHIPPED | OUTPATIENT
Start: 2023-01-18

## 2023-01-18 NOTE — PROGRESS NOTES
Assessment/Plan:         Diagnoses and all orders for this visit:    Hallux abducto valgus, right  -     X-ray foot right 3+ views; Future  -     Cam Boot  -     Case request operating room: BUNIONECTOMY RAJI minimally invasive with great toe osteotomy; Standing  -     Case request operating room: BUNIONECTOMY RAJI minimally invasive with great toe osteotomy    Tinea pedis of both feet  -     clotrimazole (LOTRIMIN) 1 % cream; Apply topically 2 (two) times a day    Other orders  -     Incentive spirometry; Standing  -     Insert and maintain IV line; Standing  -     Void On-Call to O R ; Standing  -     Nursing Communication CHG bath, have staff wash entire body (neck down) per pre-op bathing protocol  Routine, evening prior to, and day of surgery ; Standing  -     Place sequential compression device; Standing          Diagnosis and options discussed with patient  Patient agreeable to the plan as stated below    XR discussed with patient  Moderate HAV  She has daily pain regardless of arch supports, shoe gear changes  Even sneakers cause pain  Given smoking history would recommend a less invasive correction using Arthrex MIS bunion system  Consent was signed for right MIS Raji bunionectomy and great toe osteotomy    Surgical procedure and recovery discussed as can best be predicted  Alternatives, risks, complications covered with the patient  Appropriate postop medication discussed, educated patient on narcotic medication and its risks  Patient will be sent for preoperative testing and clearance    Patient doen not need DVT prophylaxis for this procedure    We discussed the relationship between cigarette smoking, atherosclerotic disease, and its effects on the lower extremity  I emphasized the importance of smoking cessation   PAtient clearly told she cannot smoke the week before and the 6 weeks following surgery or she risks severe complications, infection, DVT          Subjective:      Patient ID: Debora Morfin is a 39 y o  female  Patient presents with right foot pain  She has had bunion deformity for years  She has had the bump for years but over the past 6 months has been killing her  The right is worse than the left  Some shoes are ok, some shoes cause severe pain  Sneakers make it hurt like severely  She takes gabapentin for restless leg syndrome  She smokes 1ppd  She also reports a rash on her feet  IT itches  Lotrimin helps but it isn't fully going away      The following portions of the patient's history were reviewed and updated as appropriate: allergies, current medications, past family history, past medical history, past social history, past surgical history and problem list     Review of Systems    Constitutional: Negative  HENT: Negative for sinus pressure and sinus pain  Respiratory: Negative for cough and shortness of breath  Cardiovascular: Negative for chest pain and leg swelling  Gastrointestinal: Negative for diarrhea, nausea and vomiting  Musculoskeletal: foot pain/deformity   Skin: Negative for rash or wound  Neurological: Negative for weakness, numbness and headaches  Psychiatric/Behavioral: The patient is not nervous/anxious  Objective:      /86   Pulse 69   Ht 5' 8" (1 727 m)   Wt 89 8 kg (198 lb)   BMI 30 11 kg/m²          Physical Exam    Vitals reviewed  Constitutional:       Appearance: Patient is not ill-appearing or diaphoretic  Patient is overweight  HENT:      Nose: No congestion or rhinorrhea  Cardiovascular:        Dorsalis pedis pulses are 2+ on the right side and 2+ on the left side  Posterior tibial pulses are 2+ on the right side and 2+ on the left side  Pulmonary:      Effort: Pulmonary effort is normal  No respiratory distress  Musculoskeletal:      Right lower leg: No edema  Left lower leg: No edema  No calf pain with compression           Right foot:      Normal range of motion to ankle, STJ, midfoot  Deformity: Moderate HAV, hallux is tracking laterally  Large medial eminence with TTP     Pain: 1st MTPJ  No crepitus with ROM     Strength (MMT)   Achilles 5/5   PT  5/5   Peroneal 5/5   TA  5/5   EHL/EDL 5/5     Left foot:      Normal range of motion to ankle, STJ, midfoot  Deformity: mild HAV     Pain: none     Strength (MMT)   Achilles 5/5   PT  5/5   Peroneal 5/5   TA  5/5   EHL/EDL 5/5  Skin:     Capillary Refill: Capillary refill takes less than 2 seconds  Findings: No bruising, erythema, lesion or rash  Neurological:      Mental Status: Alert and oriented to person, place, and time  Gait: Gait normal        Right Protective Sensation: 10 sites tested  10 sites sensed  Left  Protective Sensation: 10 sites tested  10 sites sensed  Psychiatric:         Mood and Affect: Mood normal              XRay 3 views of the right foot  personally read by Dr Silviano Amaya in office today and discussed with patient:  1  IM angle 14 degrees (moderate deformity)  2  Sesamoid position 5/6  3  Minimal elevatus  4  MTPJ joint appears to have good cartilage  5   No acute findings

## 2023-01-18 NOTE — LETTER
January 19, 2023     Mike Munoz [address]  [address] [facility]    Patient: Kya Morfin   YOB: 1980   Date of Visit: 1/18/2023       Dear Dr Jeff Nelson: Thank you for referring Debora Morfin to me for evaluation  Below are my notes for this consultation  If you have questions, please do not hesitate to call me  I look forward to following your patient along with you  Sincerely,        Vikas Malhotra DPM        CC: No Recipients  JUANY Kat [facility]  1/19/2023  7:19 AM  Signed  Assessment/Plan:        Diagnoses and all orders for this visit:    Hallux abducto valgus, right  -     X-ray foot right 3+ views; Future  -     Cam Boot  -     Case request operating room: BUNIONECTOMY RAJI minimally invasive with great toe osteotomy; Standing  -     Case request operating room: BUNIONECTOMY RAJI minimally invasive with great toe osteotomy    Tinea pedis of both feet  -     clotrimazole (LOTRIMIN) 1 % cream; Apply topically 2 (two) times a day    Other orders  -     Incentive spirometry; Standing  -     Insert and maintain IV line; Standing  -     Void On-Call to O R ; Standing  -     Nursing Communication CHG bath, have staff wash entire body (neck down) per pre-op bathing protocol  Routine, evening prior to, and day of surgery ; Standing  -     Place sequential compression device; Standing         Diagnosis and options discussed with patient  Patient agreeable to the plan as stated below    XR discussed with patient  Moderate HAV  She has daily pain regardless of arch supports, shoe gear changes  Even sneakers cause pain  Given smoking history would recommend a less invasive correction using [employer] MIS bunion system  Consent was signed for right MIS Raji bunionectomy and great toe osteotomy    Surgical procedure and recovery discussed as can best be predicted    Alternatives, risks, complications covered with the patient  Appropriate postop medication discussed, educated patient on narcotic medication and its risks  Patient will be sent for preoperative testing and clearance    Patient doen not need DVT prophylaxis for this procedure    We discussed the relationship between cigarette smoking, atherosclerotic disease, and its effects on the lower extremity  I emphasized the importance of smoking cessation  PAtient clearly told she cannot smoke the week before and the 6 weeks following surgery or she risks severe complications, infection, DVT          Subjective:     Patient ID: Winter Navjot is a 39 y o  female  Patient presents with right foot pain  She has had bunion deformity for years  She has had the bump for years but over the past 6 months has been killing her  The right is worse than the left  Some shoes are ok, some shoes cause severe pain  Sneakers make it hurt like severely  She takes gabapentin for restless leg syndrome  She smokes 1ppd  She also reports a rash on her feet  IT itches  Lotrimin helps but it isn't fully going away      The following portions of the patient's history were reviewed and updated as appropriate: allergies, current medications, past family history, past medical history, past social history, past surgical history and problem list     Review of Systems   Constitutional: Negative  HENT: Negative for sinus pressure and sinus pain  Respiratory: Negative for cough and shortness of breath  Cardiovascular: Negative for chest pain and leg swelling  Gastrointestinal: Negative for diarrhea, nausea and vomiting  Musculoskeletal: foot pain/deformity   Skin: Negative for rash or wound  Neurological: Negative for weakness, numbness and headaches  Psychiatric/Behavioral: The patient is not nervous/anxious  Objective:      /86   Pulse 69   Ht 5' 8" (1 727 m)   Wt 89 8 kg (198 lb)   BMI 30 11 kg/m²         Physical Exam   Vitals reviewed     Constitutional: Appearance: Patient is not ill-appearing or diaphoretic  Patient is overweight  HENT:      Nose: No congestion or rhinorrhea  Cardiovascular:        Dorsalis pedis pulses are 2+ on the right side and 2+ on the left side  Posterior tibial pulses are 2+ on the right side and 2+ on the left side  Pulmonary:      Effort: Pulmonary effort is normal  No respiratory distress  Musculoskeletal:      Right lower leg: No edema  Left lower leg: No edema  No calf pain with compression           Right foot:      Normal range of motion to ankle, STJ, midfoot  Deformity: Moderate HAV, hallux is tracking laterally  Large medial eminence with TTP     Pain: 1st MTPJ  No crepitus with ROM     Strength (MMT)   Achilles 5/5   PT  5/5   Peroneal 5/5   TA  5/5   EHL/EDL 5/5     Left foot:      Normal range of motion to ankle, STJ, midfoot  Deformity: mild HAV     Pain: none     Strength (MMT)   Achilles 5/5   PT  5/5   Peroneal 5/5   TA  5/5   EHL/EDL 5/5  Skin:     Capillary Refill: Capillary refill takes less than 2 seconds  Findings: No bruising, erythema, lesion or rash  Neurological:      Mental Status: Alert and oriented to person, place, and time  Gait: Gait normal        Right Protective Sensation: 10 sites tested  10 sites sensed  Left  Protective Sensation: 10 sites tested  10 sites sensed  Psychiatric:         Mood and Affect: Mood normal              XRay 3 views of the right foot  personally read by Dr Sumi Arrington in office today and discussed with patient:  1  IM angle 14 degrees (moderate deformity)  2  Sesamoid position 5/6  3  Minimal elevatus  4  MTPJ joint appears to have good cartilage  5   No acute findings

## 2023-01-26 ENCOUNTER — ANESTHESIA EVENT (OUTPATIENT)
Dept: PERIOP | Facility: AMBULARY SURGERY CENTER | Age: 43
End: 2023-01-26

## 2023-01-29 ENCOUNTER — APPOINTMENT (OUTPATIENT)
Dept: LAB | Age: 43
End: 2023-01-29

## 2023-01-29 DIAGNOSIS — M20.11 ACQUIRED HALLUX VALGUS OF RIGHT FOOT: ICD-10-CM

## 2023-01-29 LAB
ANION GAP SERPL CALCULATED.3IONS-SCNC: 3 MMOL/L (ref 4–13)
BASOPHILS # BLD AUTO: 0.03 THOUSANDS/ÂΜL (ref 0–0.1)
BASOPHILS NFR BLD AUTO: 1 % (ref 0–1)
BUN SERPL-MCNC: 13 MG/DL (ref 5–25)
CALCIUM SERPL-MCNC: 9.1 MG/DL (ref 8.3–10.1)
CHLORIDE SERPL-SCNC: 106 MMOL/L (ref 96–108)
CO2 SERPL-SCNC: 30 MMOL/L (ref 21–32)
CREAT SERPL-MCNC: 0.88 MG/DL (ref 0.6–1.3)
EOSINOPHIL # BLD AUTO: 0.07 THOUSAND/ÂΜL (ref 0–0.61)
EOSINOPHIL NFR BLD AUTO: 2 % (ref 0–6)
ERYTHROCYTE [DISTWIDTH] IN BLOOD BY AUTOMATED COUNT: 12.4 % (ref 11.6–15.1)
GFR SERPL CREATININE-BSD FRML MDRD: 80 ML/MIN/1.73SQ M
GLUCOSE P FAST SERPL-MCNC: 85 MG/DL (ref 65–99)
HCT VFR BLD AUTO: 45.3 % (ref 34.8–46.1)
HGB BLD-MCNC: 14.9 G/DL (ref 11.5–15.4)
IMM GRANULOCYTES # BLD AUTO: 0.01 THOUSAND/UL (ref 0–0.2)
IMM GRANULOCYTES NFR BLD AUTO: 0 % (ref 0–2)
LYMPHOCYTES # BLD AUTO: 1.52 THOUSANDS/ÂΜL (ref 0.6–4.47)
LYMPHOCYTES NFR BLD AUTO: 32 % (ref 14–44)
MCH RBC QN AUTO: 32.5 PG (ref 26.8–34.3)
MCHC RBC AUTO-ENTMCNC: 32.9 G/DL (ref 31.4–37.4)
MCV RBC AUTO: 99 FL (ref 82–98)
MONOCYTES # BLD AUTO: 0.43 THOUSAND/ÂΜL (ref 0.17–1.22)
MONOCYTES NFR BLD AUTO: 9 % (ref 4–12)
NEUTROPHILS # BLD AUTO: 2.72 THOUSANDS/ÂΜL (ref 1.85–7.62)
NEUTS SEG NFR BLD AUTO: 56 % (ref 43–75)
NRBC BLD AUTO-RTO: 0 /100 WBCS
PLATELET # BLD AUTO: 370 THOUSANDS/UL (ref 149–390)
PMV BLD AUTO: 10 FL (ref 8.9–12.7)
POTASSIUM SERPL-SCNC: 4.6 MMOL/L (ref 3.5–5.3)
RBC # BLD AUTO: 4.59 MILLION/UL (ref 3.81–5.12)
SODIUM SERPL-SCNC: 139 MMOL/L (ref 135–147)
WBC # BLD AUTO: 4.78 THOUSAND/UL (ref 4.31–10.16)

## 2023-01-31 RX ORDER — GABAPENTIN 600 MG/1
TABLET ORAL
COMMUNITY
Start: 2023-01-12

## 2023-01-31 NOTE — PRE-PROCEDURE INSTRUCTIONS
Pre-Surgery Instructions:   Medication Instructions   • albuterol (PROVENTIL HFA,VENTOLIN HFA) 90 mcg/act inhaler May use day of surgery if needed     • cetirizine (ZyrTEC) 10 mg tablet Hold day of surgery     • clotrimazole (LOTRIMIN) 1 % cream Hold day of surgery     • gabapentin (NEURONTIN) 600 MG tablet Normally takes at night  • omeprazole (PriLOSEC) 20 mg delayed release capsule Take this medication day of surgery if normally taken in the morning  My Surgical Experience    The following information was developed to assist you to prepare for your operation  What do I need to do before coming to the hospital?  • Arrange for a responsible person to drive you to and from the hospital   • Arrange care for your children at home  Children are not allowed in the recovery areas of the hospital  • Plan to wear clothing that is easy to put on and take off  If you are having shoulder surgery, wear a shirt that buttons or zippers in the front  Bathing  o Shower the evening before and the morning of your surgery with an antibacterial soap  Please refer to the Pre Op Showering Instructions for Surgery Patients Sheet   o Remove nail polish and all body piercing jewelry  o Do not shave any body part for at least 24 hours before surgery-this includes face, arms, legs and upper body  Food  o Nothing to eat or drink after midnight the night before your surgery  This includes candy and chewing gum  o Exception: If your surgery is after 12:00pm (noon), you may have clear liquids such as 7-Up®, ginger ale, apple or cranberry juice, Jell-O®, water, or clear broth until 8:00 am  o Do not drink milk or juice with pulp on the morning before surgery  o Do not drink alcohol 24 hours before surgery  Medicine  o Follow instructions you received from your surgeon about which medicines you may take on the day of surgery  o If instructed to take medicine on the morning of surgery, take pills with just a small sip of water   Call your prescribing doctor for specific infroamtion on what to do if you take insulin    What should I bring to the hospital?    Bring:  • Crutches or a walker, if you have them, for foot or knee surgery  • A list of the daily medicines, vitamins, minerals, herbals and nutritional supplements you take  Include the dosages of medicines and the time you take them each day  • Glasses, dentures or hearing aids  • Minimal clothing; you will be wearing hospital sleepwear  • Photo ID; required to verify your identity  • If you have a Living Will or Power of , bring a copy of the documents  • If you have an ostomy, bring an extra pouch and any supplies you use    Do not bring  • Medicines or inhalers  • Money, valuables or jewelry    What other information should I know about the day of surgery? • Notify your surgeons if you develop a cold, sore throat, cough, fever, rash or any other illness  • Report to the Ambulatory Surgical/Same Day Surgery Unit  • You will be instructed to stop at Registration only if you have not been pre-registered  • Inform your  fi they do not stay that they will be asked by the staff to leave a phone number where they can be reached  • Be available to be reached before surgery  In the event the operating room schedule changes, you may be asked to come in earlier or later than expected    *It is important to tell your doctor and others involved in your health care if you are taking or have been taking any non-prescription drugs, vitamins, minerals, herbals or other nutritional supplements   Any of these may interact with some food or medicines and cause a reaction

## 2023-02-02 ENCOUNTER — APPOINTMENT (OUTPATIENT)
Dept: RADIOLOGY | Facility: AMBULARY SURGERY CENTER | Age: 43
End: 2023-02-02

## 2023-02-02 ENCOUNTER — ANESTHESIA (OUTPATIENT)
Dept: PERIOP | Facility: AMBULARY SURGERY CENTER | Age: 43
End: 2023-02-02

## 2023-02-02 ENCOUNTER — HOSPITAL ENCOUNTER (OUTPATIENT)
Facility: AMBULARY SURGERY CENTER | Age: 43
Setting detail: OUTPATIENT SURGERY
Discharge: HOME | End: 2023-02-02
Attending: PODIATRIST | Admitting: PODIATRIST

## 2023-02-02 VITALS
BODY MASS INDEX: 30.01 KG/M2 | DIASTOLIC BLOOD PRESSURE: 74 MMHG | SYSTOLIC BLOOD PRESSURE: 135 MMHG | WEIGHT: 198 LBS | HEIGHT: 68 IN | HEART RATE: 76 BPM | OXYGEN SATURATION: 95 % | RESPIRATION RATE: 16 BRPM | TEMPERATURE: 97 F

## 2023-02-02 DIAGNOSIS — G89.18 POST-OPERATIVE PAIN: Primary | ICD-10-CM

## 2023-02-02 PROBLEM — M20.11 HALLUX ABDUCTOVALGUS, RIGHT: Status: ACTIVE | Noted: 2023-02-02

## 2023-02-02 PROBLEM — Z98.890 PONV (POSTOPERATIVE NAUSEA AND VOMITING): Status: ACTIVE | Noted: 2023-02-02

## 2023-02-02 PROBLEM — R11.2 PONV (POSTOPERATIVE NAUSEA AND VOMITING): Status: ACTIVE | Noted: 2023-02-02

## 2023-02-02 PROBLEM — T88.59XA DELAYED EMERGENCE FROM ANESTHESIA: Status: ACTIVE | Noted: 2023-02-02

## 2023-02-02 DEVICE — IMPLANTABLE DEVICE: Type: IMPLANTABLE DEVICE | Site: FOOT | Status: FUNCTIONAL

## 2023-02-02 RX ORDER — ALBUTEROL SULFATE 2.5 MG/3ML
2.5 SOLUTION RESPIRATORY (INHALATION) ONCE AS NEEDED
Status: DISCONTINUED | OUTPATIENT
Start: 2023-02-02 | End: 2023-02-02 | Stop reason: HOSPADM

## 2023-02-02 RX ORDER — ACETAMINOPHEN 325 MG/1
650 TABLET ORAL EVERY 4 HOURS PRN
Status: DISCONTINUED | OUTPATIENT
Start: 2023-02-02 | End: 2023-02-02 | Stop reason: HOSPADM

## 2023-02-02 RX ORDER — FENTANYL CITRATE/PF 50 MCG/ML
25 SYRINGE (ML) INJECTION
Status: DISCONTINUED | OUTPATIENT
Start: 2023-02-02 | End: 2023-02-02 | Stop reason: HOSPADM

## 2023-02-02 RX ORDER — ONDANSETRON 2 MG/ML
4 INJECTION INTRAMUSCULAR; INTRAVENOUS ONCE AS NEEDED
Status: DISCONTINUED | OUTPATIENT
Start: 2023-02-02 | End: 2023-02-02 | Stop reason: HOSPADM

## 2023-02-02 RX ORDER — HYDROMORPHONE HCL/PF 1 MG/ML
0.5 SYRINGE (ML) INJECTION
Status: DISCONTINUED | OUTPATIENT
Start: 2023-02-02 | End: 2023-02-02 | Stop reason: HOSPADM

## 2023-02-02 RX ORDER — ONDANSETRON 2 MG/ML
INJECTION INTRAMUSCULAR; INTRAVENOUS AS NEEDED
Status: DISCONTINUED | OUTPATIENT
Start: 2023-02-02 | End: 2023-02-02

## 2023-02-02 RX ORDER — FENTANYL CITRATE 50 UG/ML
INJECTION, SOLUTION INTRAMUSCULAR; INTRAVENOUS AS NEEDED
Status: DISCONTINUED | OUTPATIENT
Start: 2023-02-02 | End: 2023-02-02

## 2023-02-02 RX ORDER — DEXAMETHASONE SODIUM PHOSPHATE 10 MG/ML
INJECTION, SOLUTION INTRAMUSCULAR; INTRAVENOUS AS NEEDED
Status: DISCONTINUED | OUTPATIENT
Start: 2023-02-02 | End: 2023-02-02

## 2023-02-02 RX ORDER — SODIUM CHLORIDE, SODIUM LACTATE, POTASSIUM CHLORIDE, CALCIUM CHLORIDE 600; 310; 30; 20 MG/100ML; MG/100ML; MG/100ML; MG/100ML
INJECTION, SOLUTION INTRAVENOUS CONTINUOUS PRN
Status: DISCONTINUED | OUTPATIENT
Start: 2023-02-02 | End: 2023-02-02

## 2023-02-02 RX ORDER — ALBUTEROL SULFATE 2.5 MG/3ML
2.5 SOLUTION RESPIRATORY (INHALATION) ONCE
Status: COMPLETED | OUTPATIENT
Start: 2023-02-02 | End: 2023-02-02

## 2023-02-02 RX ORDER — HYDROMORPHONE HCL/PF 1 MG/ML
0.2 SYRINGE (ML) INJECTION
Status: DISCONTINUED | OUTPATIENT
Start: 2023-02-02 | End: 2023-02-02 | Stop reason: HOSPADM

## 2023-02-02 RX ORDER — LIDOCAINE HYDROCHLORIDE 10 MG/ML
INJECTION, SOLUTION EPIDURAL; INFILTRATION; INTRACAUDAL; PERINEURAL AS NEEDED
Status: DISCONTINUED | OUTPATIENT
Start: 2023-02-02 | End: 2023-02-02

## 2023-02-02 RX ORDER — MIDAZOLAM HYDROCHLORIDE 2 MG/2ML
INJECTION, SOLUTION INTRAMUSCULAR; INTRAVENOUS AS NEEDED
Status: DISCONTINUED | OUTPATIENT
Start: 2023-02-02 | End: 2023-02-02

## 2023-02-02 RX ORDER — PROPOFOL 10 MG/ML
INJECTION, EMULSION INTRAVENOUS CONTINUOUS PRN
Status: DISCONTINUED | OUTPATIENT
Start: 2023-02-02 | End: 2023-02-02

## 2023-02-02 RX ORDER — LABETALOL HYDROCHLORIDE 5 MG/ML
10 INJECTION, SOLUTION INTRAVENOUS
Status: DISCONTINUED | OUTPATIENT
Start: 2023-02-02 | End: 2023-02-02 | Stop reason: HOSPADM

## 2023-02-02 RX ORDER — DEXMEDETOMIDINE HYDROCHLORIDE 100 UG/ML
INJECTION, SOLUTION INTRAVENOUS AS NEEDED
Status: DISCONTINUED | OUTPATIENT
Start: 2023-02-02 | End: 2023-02-02

## 2023-02-02 RX ORDER — PROMETHAZINE HYDROCHLORIDE 25 MG/ML
12.5 INJECTION, SOLUTION INTRAMUSCULAR; INTRAVENOUS ONCE AS NEEDED
Status: DISCONTINUED | OUTPATIENT
Start: 2023-02-02 | End: 2023-02-02 | Stop reason: HOSPADM

## 2023-02-02 RX ORDER — METOCLOPRAMIDE HYDROCHLORIDE 5 MG/ML
10 INJECTION INTRAMUSCULAR; INTRAVENOUS ONCE AS NEEDED
Status: DISCONTINUED | OUTPATIENT
Start: 2023-02-02 | End: 2023-02-02 | Stop reason: HOSPADM

## 2023-02-02 RX ORDER — HYDRALAZINE HYDROCHLORIDE 20 MG/ML
5 INJECTION INTRAMUSCULAR; INTRAVENOUS
Status: DISCONTINUED | OUTPATIENT
Start: 2023-02-02 | End: 2023-02-02 | Stop reason: HOSPADM

## 2023-02-02 RX ORDER — SCOLOPAMINE TRANSDERMAL SYSTEM 1 MG/1
1 PATCH, EXTENDED RELEASE TRANSDERMAL ONCE
Status: DISCONTINUED | OUTPATIENT
Start: 2023-02-02 | End: 2023-02-02 | Stop reason: HOSPADM

## 2023-02-02 RX ORDER — CEFAZOLIN SODIUM 2 G/50ML
2000 SOLUTION INTRAVENOUS ONCE
Status: COMPLETED | OUTPATIENT
Start: 2023-02-02 | End: 2023-02-02

## 2023-02-02 RX ORDER — OXYCODONE HYDROCHLORIDE AND ACETAMINOPHEN 5; 325 MG/1; MG/1
1 TABLET ORAL EVERY 4 HOURS PRN
Qty: 18 TABLET | Refills: 0 | Status: SHIPPED | OUTPATIENT
Start: 2023-02-02 | End: 2023-02-12

## 2023-02-02 RX ORDER — SODIUM CHLORIDE, SODIUM LACTATE, POTASSIUM CHLORIDE, CALCIUM CHLORIDE 600; 310; 30; 20 MG/100ML; MG/100ML; MG/100ML; MG/100ML
125 INJECTION, SOLUTION INTRAVENOUS CONTINUOUS
Status: DISCONTINUED | OUTPATIENT
Start: 2023-02-02 | End: 2023-02-02 | Stop reason: HOSPADM

## 2023-02-02 RX ORDER — OXYCODONE HYDROCHLORIDE AND ACETAMINOPHEN 5; 325 MG/1; MG/1
1 TABLET ORAL EVERY 4 HOURS PRN
Status: DISCONTINUED | OUTPATIENT
Start: 2023-02-02 | End: 2023-02-02 | Stop reason: HOSPADM

## 2023-02-02 RX ADMIN — ONDANSETRON 4 MG: 2 INJECTION INTRAMUSCULAR; INTRAVENOUS at 13:14

## 2023-02-02 RX ADMIN — ALBUTEROL SULFATE 2.5 MG: 2.5 SOLUTION RESPIRATORY (INHALATION) at 11:16

## 2023-02-02 RX ADMIN — CEFAZOLIN SODIUM 2000 MG: 2 SOLUTION INTRAVENOUS at 12:19

## 2023-02-02 RX ADMIN — DEXAMETHASONE SODIUM PHOSPHATE 10 MG: 10 INJECTION, SOLUTION INTRAMUSCULAR; INTRAVENOUS at 12:25

## 2023-02-02 RX ADMIN — SCOPOLAMINE 1 PATCH: 1 SYSTEM TRANSDERMAL at 11:17

## 2023-02-02 RX ADMIN — FENTANYL CITRATE 50 MCG: 50 INJECTION, SOLUTION INTRAMUSCULAR; INTRAVENOUS at 13:11

## 2023-02-02 RX ADMIN — FENTANYL CITRATE 50 MCG: 50 INJECTION, SOLUTION INTRAMUSCULAR; INTRAVENOUS at 12:26

## 2023-02-02 RX ADMIN — SODIUM CHLORIDE, SODIUM LACTATE, POTASSIUM CHLORIDE, AND CALCIUM CHLORIDE: .6; .31; .03; .02 INJECTION, SOLUTION INTRAVENOUS at 12:05

## 2023-02-02 RX ADMIN — LIDOCAINE HYDROCHLORIDE 50 MG: 10 INJECTION, SOLUTION EPIDURAL; INFILTRATION; INTRACAUDAL at 12:23

## 2023-02-02 RX ADMIN — SODIUM CHLORIDE, SODIUM LACTATE, POTASSIUM CHLORIDE, AND CALCIUM CHLORIDE: .6; .31; .03; .02 INJECTION, SOLUTION INTRAVENOUS at 13:30

## 2023-02-02 RX ADMIN — MIDAZOLAM HYDROCHLORIDE 2 MG: 1 INJECTION, SOLUTION INTRAMUSCULAR; INTRAVENOUS at 12:19

## 2023-02-02 RX ADMIN — FENTANYL CITRATE 50 MCG: 50 INJECTION, SOLUTION INTRAMUSCULAR; INTRAVENOUS at 12:41

## 2023-02-02 RX ADMIN — FENTANYL CITRATE 50 MCG: 50 INJECTION, SOLUTION INTRAMUSCULAR; INTRAVENOUS at 12:23

## 2023-02-02 RX ADMIN — DEXMEDETOMIDINE HYDROCHLORIDE 8 MCG: 100 INJECTION, SOLUTION INTRAVENOUS at 12:24

## 2023-02-02 RX ADMIN — PROPOFOL 180 MCG/KG/MIN: 10 INJECTION, EMULSION INTRAVENOUS at 12:23

## 2023-02-02 NOTE — ANESTHESIA POSTPROCEDURE EVALUATION
Post-Op Assessment Note    CV Status:  Stable  Pain Score: 0    Pain management: adequate     Mental Status:  Sleepy   Hydration Status:  Euvolemic   PONV Controlled:  Controlled   Airway Patency:  Patent      Post Op Vitals Reviewed: Yes      Staff: CRNA         No notable events documented      BP   148/81   Temp  97 3   Pulse  78   Resp   12   SpO2   99

## 2023-02-02 NOTE — OP NOTE
OPERATIVE REPORT - Podiatry  PATIENT NAME: Debora Morfin    :  1980  MRN: [id number]  Pt Location: [facility] OR ROOM 06    SURGERY DATE: 2023    Surgeon(s) and Role:     * Sailaja Underwood DPM - Primary     * Yuni Corbin DPM - Assisting     * Yaniv Clifton DPM - Observing    Pre-op Diagnosis:  Hallux abducto valgus, right [M20 11]    Post-Op Diagnosis Codes:     * Hallux abducto valgus, right [M20 11]    Procedure(s) (LRB):  BUNIONECTOMY RAJI minimally invasive with great toe osteotomy (Right)    Specimen(s):  * No specimens in log *    Estimated Blood Loss:   Minimal    Drains:  * No LDAs found *    Anesthesia Type:   General/LMA with 20 ml of 1% Lidocaine and 0 5% Bupivacaine in a 1:1 mixture    Hemostasis:  -Atraumatic technique    Materials:  Implant Name Type Inv  Item Serial No   Lot No  LRB No  Used Action   4 0 X 50MM BEVELED SCREW    [employer]  Right 1 Implanted   3 5 X 30MM BEVELED SCREW    [employer]  Right 1 Implanted     -4-0 nylon    Operative Findings:  Consistent with diagnosis    Complications:   None    Procedure and Technique:     Under mild sedation, the patient was brought into the operating room and placed on the operating room table in the supine position  IV sedation was achieved by anesthesia team and a universal timeout was performed where all parties are in agreement of correct patient, correct procedure and correct site  A Paulino block was performed consisting of 20 ml of 1% Lidocaine and 0 5% Bupivacaine in a 1:1 mixture  The foot was then prepped and draped in the usual aseptic manner  Attention was then directed to the right foot  Under fluoroscopic guidance the central portion of the first metatarsal was drawn out the lateral view  On an AP view under fluoroscopy the metaphyseal region of the first metatarsal was marked out, just proximal to the sesamoid apparatus    A small stab incision was created with a 15 blade at this location, per manufactures guidelines/recommendations the bur was inserted medially at the first metatarsal, under fluoroscopy was noted to be perpendicular to the first metatarsal shaft  The bur was then utilized to enter the central portion of the first metatarsal and make a transverse cut  Per 's guidelines the MIS jig was inserted and proper lateral translation of the first metatarsal head, as well as frontal plane rotation was noted  The jig was secured in place with 1 K wire  Another K wire was inserted from proximal medial to distal lateral across the first metatarsal and into the capital fragment  Wire guides were inserted into proper locations of the jig per 's guidelines  Small stab incisions were created in order to ensure that they properly were against bone  2 K wires were then inserted parallel to 1 another starting from the base of the first metatarsal medially and going across the capital fragment, with a second K wire being just distal to this  Proper length of the wires was confirmed under fluoroscopy  Screw length was measured off of the wires  The more proximal wire was then drilled, and an appropriately sized beveled screw was inserted across the base of the first metatarsal and into the capital fragment  This same process was then performed to the more distal wire  Screw sizes are noted above in the implant section  The beveled screws were inserted so that they were flush against the medial aspect of the first metatarsal   The jig as well as the K wires were then removed  Final positioning of the osteotomy, as well as the hardware was assessed under fluoroscopy and was noted to be excellent  The incision sites were then irrigated with a bulb syringe and normal sterile saline  Small medial shelf proximal to the osteotomy at the medial side of the first metatarsal was then resected utilizing a rongeur    Skin was reapproximated with 4-0 nylon horizontal mattress and simple interrupted techniques  The foot was then cleansed and dried and the incision sites were dressed with Xeroform, 4 x 4 gauze, Kerlix, and a 4 inch Ace bandage  Normal hyperemic response was noted to all digits  The patient tolerated the procedure and anesthesia well without immediate complications and transferred to PACU with vital signs stable  Dr Jacob Love was present during the entire procedure and participated in all bennett aspects  Fransisca Oshea DPM  DATE: February 2, 2023  TIME: 1:53 PM      Portions of the record may have been created with voice recognition software  Occasional wrong word or "sound a like" substitutions may have occurred due to the inherent limitations of voice recognition software  Read the chart carefully and recognize, using context, where substitutions have occurred

## 2023-02-02 NOTE — ANESTHESIA PREPROCEDURE EVALUATION
Procedure:  BUNIONECTOMY RAJI minimally invasive with great toe osteotomy (Right: Foot)    Relevant Problems   ANESTHESIA   (+) Delayed emergence from anesthesia   (+) PONV (postoperative nausea and vomiting)      NEURO/PSYCH   (+) Anxiety   (+) Depression with anxiety      Musculoskeletal and Integument   (+) Hallux abductovalgus, right      Other   (+) Restless legs syndrome   (+) Tobacco use        Physical Exam    Airway    Mallampati score: II  TM Distance: >3 FB  Neck ROM: full     Dental   No notable dental hx     Cardiovascular  Rhythm: regular, Rate: normal,     Pulmonary  Wheezes,     Other Findings        Anesthesia Plan  ASA Score- 2     Anesthesia Type- IV sedation with anesthesia with ASA Monitors  Additional Monitors:   Airway Plan:           Plan Factors-Exercise tolerance (METS): >4 METS  Chart reviewed  Existing labs reviewed  Patient summary reviewed  Patient is a current smoker  Patient instructed to abstain from smoking on day of procedure  Patient smoked on day of surgery  Induction-     Postoperative Plan-     Informed Consent- Anesthetic plan and risks discussed with patient  I personally reviewed this patient with the CRNA  Discussed and agreed on the Anesthesia Plan with the CRNA           H&P reviewed   No significant changes

## 2023-02-02 NOTE — DISCHARGE INSTR - AVS FIRST PAGE
Dr Destin Swift Instructions    1  Take your prescribed medication as directed  2  Upon arrival at home, lie down and elevate your surgical foot on 2 pillows  3  Remain off your feet as much as possible, for the first 7 days  This is when your feet first swell and may become painful  Weightbearing as tolerated to right heel in surgical shoe  4  Drink large quantities of water and citrus fruit juice  Consume no alcohol  Continue a well-balanced diet  5  Report any unusual discomfort or fever to this office  6  A limited amount of discomfort and swelling is to be expected  In some cases the skin may take on a bruised appearance  The surgical solution that was applied to your foot prior to the operation is dark in color and the operation site may appear to be oozing when it actually is not  7  A slight amount of blood is to be expected, and is no cause for alarm  Do not remove the dressings  If there is active bleeding and if the bleeding persists, add additional gauze to the bandage, apply direct pressure, elevate your feet and call this office  8  Do not get the dressings wet  Sponge baths are recommended  9  When anesthesia wears off and if any discomfort should be present, apply an ice pack over the ankle as much as possible  Do not use hot water bags or electric pads  A convenient icepack can be made by placing ice cubes in a plastic bag and covering this with a towel  10  If necessary, take a mild laxative before retiring  11  Wear your surgical shoe at all times  12  Having performed the operation, we are interested in a prompt recovery  Please cooperate by following the above instructions  13  Please call to confirm your post-op appointment or call with any other questions

## 2023-02-02 NOTE — DISCHARGE SUMMARY
Discharge Summary Outpatient Procedure Podiatry -   Winter A Younger 39 y o  female MRN: [id number]  Unit/Bed#: OR POOL Encounter: [id number]    Admission Date: 2/2/2023     Admitting Diagnosis: Hallux abducto valgus, right [M20 11]    Discharge Diagnosis: same    Procedures Performed: Minimally invasive right 1st distal metatarsal bunionectomy, right (CPT 68646)    Complications: none    Condition at Discharge: stable    Discharge instructions/Information to patient and family:   See after visit summary for information provided to patient and family  Provisions for Follow-Up Care/Important appointments:  See after visit summary for information related to follow-up care and any pertinent home health orders  Discharge Medications:  See after visit summary for reconciled discharge medications provided to patient and family

## 2023-02-09 ENCOUNTER — OFFICE VISIT (OUTPATIENT)
Dept: PODIATRY | Facility: CLINIC | Age: 43
End: 2023-02-09

## 2023-02-09 VITALS
HEART RATE: 78 BPM | DIASTOLIC BLOOD PRESSURE: 75 MMHG | BODY MASS INDEX: 30.01 KG/M2 | HEIGHT: 68 IN | SYSTOLIC BLOOD PRESSURE: 130 MMHG | WEIGHT: 198 LBS

## 2023-02-09 DIAGNOSIS — M20.11 HALLUX ABDUCTO VALGUS, RIGHT: Primary | ICD-10-CM

## 2023-02-09 NOTE — PROGRESS NOTES
Assessment/Plan:      Diagnoses and all orders for this visit:    Hallux abducto valgus, right      Patient is stable postop 1 weeks    Incision: healed, sutures removed, steri strip applied  Instructions given to patient  Rest the foot as much as possible and elevate/ice  if swollen  Ambulatory status: Light WB in surgical shoe  She admits to not wearing it  She was told she must have it on if her foot is on the floor at ALL times  Images reviewed today: postop images discussed, no concerns    RTC: 1 week  Subjective:     Patient ID: Kelsi Purdy Younger is a 39 y o  female  DOS: 2/2/2023     Procedure: right MIS bunionectomy     Condition: Dressing C/D/I  She states she has minimal pain  She states she is "not really wearing the boot at home but I'm being careful "         Review of Systems      Objective:     Physical Exam  Vitals reviewed  Cardiovascular:      Pulses: Normal pulses  Musculoskeletal:         General: Swelling and tenderness present  Neurological:      Mental Status: She is alert  First ray correction maintained  Expected edema  Incisions healed, sutures intact   Ankle MMT normal, no calf pain

## 2023-02-16 ENCOUNTER — OFFICE VISIT (OUTPATIENT)
Dept: PODIATRY | Facility: CLINIC | Age: 43
End: 2023-02-16

## 2023-02-16 VITALS
HEART RATE: 75 BPM | WEIGHT: 198 LBS | SYSTOLIC BLOOD PRESSURE: 125 MMHG | HEIGHT: 68 IN | DIASTOLIC BLOOD PRESSURE: 78 MMHG | BODY MASS INDEX: 30.01 KG/M2

## 2023-02-16 DIAGNOSIS — M20.11 HALLUX ABDUCTO VALGUS, RIGHT: Primary | ICD-10-CM

## 2023-02-16 NOTE — PROGRESS NOTES
Assessment/Plan:      Diagnoses and all orders for this visit:    Hallux abducto valgus, right      Patient is stable postop 2 weeks    Incision: healed    Instructions given to patient  Rest the foot as much as possible and elevate/ice  if swollen  Ambulatory status: WB surgical shoe    Images reviewed today: none    RTC: 4 weeks, she is healing well  XR at that time      Subjective:     Patient ID: Pina Moreno Younger is a 39 y o  female  DOS: 2/2/2023     Procedure: right bunionectomy (MIS)     Condition: Dressing C/D/I  SHe feels achy but overall doing well        Review of Systems      Objective:     Physical Exam  Vitals reviewed  Musculoskeletal:         General: Swelling present  No deformity (first ray correction maintained  TTP but normal ROM  )  Neurological:      Mental Status: She is alert

## 2023-03-16 ENCOUNTER — OFFICE VISIT (OUTPATIENT)
Dept: PODIATRY | Facility: CLINIC | Age: 43
End: 2023-03-16

## 2023-03-16 ENCOUNTER — APPOINTMENT (OUTPATIENT)
Dept: RADIOLOGY | Age: 43
End: 2023-03-16

## 2023-03-16 VITALS
SYSTOLIC BLOOD PRESSURE: 127 MMHG | WEIGHT: 198 LBS | DIASTOLIC BLOOD PRESSURE: 82 MMHG | HEART RATE: 80 BPM | BODY MASS INDEX: 30.01 KG/M2 | HEIGHT: 68 IN

## 2023-03-16 DIAGNOSIS — M79.671 RIGHT FOOT PAIN: ICD-10-CM

## 2023-03-16 DIAGNOSIS — M20.11 HALLUX ABDUCTO VALGUS, RIGHT: Primary | ICD-10-CM

## 2023-03-16 NOTE — PROGRESS NOTES
Assessment/Plan:      Diagnoses and all orders for this visit:    Hallux abducto valgus, right    Right foot pain  -     X-ray foot right 3+ views; Future          Subjective:     Patient ID: Lorenzo Hummel Younger is a 39 y o  female  DOS: 2/2/2023     Procedure: Right foot minimally invasive bunionectomy     Condition: Dressing C/D/I  Patient states she has little to no pain  She has been walking in her boot without issues  She is 6 weeks postop        Review of Systems      Objective:     Physical Exam  Vitals reviewed  Cardiovascular:      Pulses: Normal pulses  Musculoskeletal:      Comments: First ray correction maintained to the right foot  No crepitus and normal range of motion to the first metatarsal phalangeal joint  Minimal tenderness to the surgical site  Patient able to stand barefoot and perform heel raise without significant foot pain  No calf pain   Skin:     Findings: No erythema  Neurological:      Mental Status: She is alert  XRay 3 views of the right foot personally read by Dr Chelsea Damon in office today and discussed with patient:  1  Osteotomy and hardware stable along the first ray  Correction maintained  2  Evidence of bone callus at the osteotomy site to be expected  3   No acute concerns 6 weeks postop minimally invasive bunionectomy

## 2023-03-16 NOTE — LETTER
March 16, 2023     Patient: Debora Morfin  YOB: 1980  Date of Visit: 3/16/2023      To Whom it May Concern:    Winter Navjot is under my professional care  Winter was seen in my office on 3/16/2023  Winter may return to work on 3/17/2023  She no longer needs to wear the surgical boot but I am placing her in a sneaker for 4-6 more weeks       If you have any questions or concerns, please don't hesitate to call           Sincerely,          Tigist Solano DPM        CC: No Recipients

## 2023-05-24 ENCOUNTER — APPOINTMENT (OUTPATIENT)
Dept: RADIOLOGY | Age: 43
End: 2023-05-24

## 2023-05-24 ENCOUNTER — OFFICE VISIT (OUTPATIENT)
Dept: PODIATRY | Facility: CLINIC | Age: 43
End: 2023-05-24

## 2023-05-24 VITALS — WEIGHT: 198 LBS | HEIGHT: 68 IN | BODY MASS INDEX: 30.01 KG/M2

## 2023-05-24 DIAGNOSIS — M79.671 RIGHT FOOT PAIN: ICD-10-CM

## 2023-05-24 DIAGNOSIS — M20.11 HALLUX ABDUCTO VALGUS, RIGHT: ICD-10-CM

## 2023-05-24 DIAGNOSIS — M84.374A STRESS FRACTURE OF METATARSAL BONE OF RIGHT FOOT, INITIAL ENCOUNTER: Primary | ICD-10-CM

## 2023-05-24 NOTE — PROGRESS NOTES
"Assessment/Plan:       Diagnoses and all orders for this visit:    Stress fracture of metatarsal bone of right foot, initial encounter  -     Cam Boot    Hallux abducto valgus, right  -     X-ray foot right 3+ views; Future  -     Cam Boot    Right foot pain  -     X-ray foot right 3+ views; Future  -     Cam Boot      Diagnosis and options discussed with patient  Patient agreeable to the plan as stated below    The bunion surgery is healed on XR and clinically  No first ray pain  The screw head is palpated but currently not causing pain    SHe has sharp pain and edema base of 2,3 metatarsal Suspect stress fracture  CAM boot  RICE protocol  Check in 4 weeks  If not improving, MRI  The hardware is titanium and safe for MRI    Subjective:      Patient ID: Claudeen Rm Younger is a 39 y o  female  Patient had bunion surgery last February  At 6 weeks her foot felt good  SHe began walking  She started having severe midfoot pain a month or 2 ago but just tried waiting to see if it would get better  IT is now getting worse  She poiunt to the midfoot as the source of pain  She states the bunion does not hurt  She denies trauma  The following portions of the patient's history were reviewed and updated as appropriate: allergies, current medications, past family history, past medical history, past social history, past surgical history and problem list     Review of Systems    Constitutional: Negative  Respiratory: Negative for cough and shortness of breath  Gastrointestinal: Negative for diarrhea, nausea and vomiting  Musculoskeletal: right foot pain  Skin: Negative for rash or wound  Neurological: Negative for weakness, numbness and headaches  Objective:      Ht 5' 8\" (1 727 m)   Wt 89 8 kg (198 lb)   LMP 02/21/2019   BMI 30 11 kg/m²          Physical Exam  Vitals reviewed  Constitutional:       Appearance: She is obese  She is not ill-appearing or diaphoretic     Cardiovascular:      Rate and " Rhythm: Normal rate  Pulses: Normal pulses  Pulmonary:      Effort: Pulmonary effort is normal  No respiratory distress  Musculoskeletal:         General: Tenderness (sharp pain right 2,3 met bases  No first ray pain  NOrmal first ray ROM) present  Skin:     Findings: No erythema or rash  Neurological:      Mental Status: She is alert  XRay 3 views of the right foot personally read by Dr Leonardo Barajas in office today and discussed with patient:  Recent bunion surgery is healed  Osteotomy looks healed  The screws are proud in the bone but clinically this isn't her source of pain  I don't see evidence of stress reaction or fracture to the 2,3 metatarsals  Deon Walsh

## 2023-06-21 ENCOUNTER — OFFICE VISIT (OUTPATIENT)
Dept: PODIATRY | Facility: CLINIC | Age: 43
End: 2023-06-21
Payer: COMMERCIAL

## 2023-06-21 ENCOUNTER — APPOINTMENT (OUTPATIENT)
Dept: RADIOLOGY | Age: 43
End: 2023-06-21
Payer: COMMERCIAL

## 2023-06-21 VITALS
SYSTOLIC BLOOD PRESSURE: 133 MMHG | DIASTOLIC BLOOD PRESSURE: 83 MMHG | HEART RATE: 73 BPM | HEIGHT: 68 IN | WEIGHT: 198 LBS | BODY MASS INDEX: 30.01 KG/M2

## 2023-06-21 DIAGNOSIS — M79.671 RIGHT FOOT PAIN: ICD-10-CM

## 2023-06-21 DIAGNOSIS — M19.079 ARTHRITIS OF MIDFOOT: Primary | ICD-10-CM

## 2023-06-21 DIAGNOSIS — M84.374A STRESS FRACTURE OF METATARSAL BONE OF RIGHT FOOT, INITIAL ENCOUNTER: ICD-10-CM

## 2023-06-21 PROCEDURE — 73630 X-RAY EXAM OF FOOT: CPT

## 2023-06-21 PROCEDURE — 99213 OFFICE O/P EST LOW 20 MIN: CPT | Performed by: PODIATRIST

## 2023-06-21 RX ORDER — METHYLPREDNISOLONE 4 MG/1
TABLET ORAL
Qty: 1 EACH | Refills: 0 | Status: SHIPPED | OUTPATIENT
Start: 2023-06-21

## 2023-06-21 NOTE — PROGRESS NOTES
Assessment/Plan:       Diagnoses and all orders for this visit:    Arthritis of midfoot  -     CT lower extremity wo contrast right; Future  -     methylPREDNISolone 4 MG tablet therapy pack; Use as directed on package    Right foot pain  -     X-ray foot right 3+ views; Future  -     CT lower extremity wo contrast right; Future  -     methylPREDNISolone 4 MG tablet therapy pack; Use as directed on package    Stress fracture of metatarsal bone of right foot, initial encounter  -     CT lower extremity wo contrast right; Future  -     methylPREDNISolone 4 MG tablet therapy pack; Use as directed on package      Diagnosis and options discussed with patient  Patient agreeable to the plan as stated below    Patient is having severe pain of the right midfoot  Questionable stress fracture versus degenerative second third tarsometatarsal joint  Unusually her pain is not improving with cam boot immobilization, anti-inflammatories, RICE protocol  I would like to get a CT scan  She does not have any pain from her previous bunion site and that seems to have healed well  Medrol taper for the acute pain  Dosing discussed    Subjective:      Patient ID: Mckenzie Garcia Younger is a 39 y o  female  PAtient has been getting sharp pain at the 2,3 TMTJ  She was diagnosed with possible stress fracture and put in a boot  She has been in the boot for 4 weeks and it isn't doing anything  HEr pain is just as bad, even in the boot  She has tried gabapentin, NSAIDs, tylenol, alleve and they do not have any effect on her discomfort  The following portions of the patient's history were reviewed and updated as appropriate: allergies, current medications, past family history, past medical history, past social history, past surgical history and problem list     Review of Systems    Constitutional: Negative  Respiratory: Negative for cough and shortness of breath  Gastrointestinal: Negative for diarrhea, nausea and vomiting  "  Musculoskeletal: right foot pain  Skin: Negative for rash or wound  Neurological: Negative for weakness, numbness and headaches  Objective:      /83   Pulse 73   Ht 5' 8\" (1 727 m)   Wt 89 8 kg (198 lb)   LMP 02/21/2019   BMI 30 11 kg/m²          Physical Exam  Vitals reviewed  Constitutional:       Appearance: She is obese  Cardiovascular:      Rate and Rhythm: Normal rate  Pulses: Normal pulses  Pulmonary:      Effort: Pulmonary effort is normal  No respiratory distress  Musculoskeletal:         General: Tenderness (Sharp pain at the second third tarsometatarsal joint with direct palpation and manipulation of the second and third ray  No metatarsal phalangeal joint pain  No first ray pain ) present  Skin:     Capillary Refill: Capillary refill takes less than 2 seconds  Findings: No bruising  Neurological:      Mental Status: She is alert and oriented to person, place, and time  Sensory: No sensory deficit           "

## 2023-06-28 ENCOUNTER — HOSPITAL ENCOUNTER (OUTPATIENT)
Dept: RADIOLOGY | Facility: HOSPITAL | Age: 43
Discharge: HOME | End: 2023-06-28
Attending: PODIATRIST
Payer: COMMERCIAL

## 2023-06-28 ENCOUNTER — APPOINTMENT (OUTPATIENT)
Dept: LAB | Facility: CLINIC | Age: 43
End: 2023-06-28
Payer: COMMERCIAL

## 2023-06-28 DIAGNOSIS — Z13.6 SCREENING FOR CARDIOVASCULAR CONDITION: ICD-10-CM

## 2023-06-28 DIAGNOSIS — M19.079 ARTHRITIS OF MIDFOOT: ICD-10-CM

## 2023-06-28 DIAGNOSIS — M79.671 RIGHT FOOT PAIN: ICD-10-CM

## 2023-06-28 DIAGNOSIS — M84.374A STRESS FRACTURE OF METATARSAL BONE OF RIGHT FOOT, INITIAL ENCOUNTER: ICD-10-CM

## 2023-06-28 DIAGNOSIS — Z00.00 ROUTINE GENERAL MEDICAL EXAMINATION AT A HEALTH CARE FACILITY: ICD-10-CM

## 2023-06-28 LAB
ALBUMIN SERPL BCP-MCNC: 3.5 G/DL (ref 3.5–5)
ALP SERPL-CCNC: 71 U/L (ref 46–116)
ALT SERPL W P-5'-P-CCNC: 30 U/L (ref 12–78)
ANION GAP SERPL CALCULATED.3IONS-SCNC: 1 MMOL/L
AST SERPL W P-5'-P-CCNC: 8 U/L (ref 5–45)
BASOPHILS # BLD AUTO: 0.01 THOUSANDS/ÂΜL (ref 0–0.1)
BASOPHILS NFR BLD AUTO: 0 % (ref 0–1)
BILIRUB SERPL-MCNC: 0.31 MG/DL (ref 0.2–1)
BUN SERPL-MCNC: 16 MG/DL (ref 5–25)
CALCIUM SERPL-MCNC: 9.1 MG/DL (ref 8.3–10.1)
CHLORIDE SERPL-SCNC: 107 MMOL/L (ref 96–108)
CHOLEST SERPL-MCNC: 214 MG/DL
CO2 SERPL-SCNC: 29 MMOL/L (ref 21–32)
CREAT SERPL-MCNC: 0.92 MG/DL (ref 0.6–1.3)
EOSINOPHIL # BLD AUTO: 0.07 THOUSAND/ÂΜL (ref 0–0.61)
EOSINOPHIL NFR BLD AUTO: 1 % (ref 0–6)
ERYTHROCYTE [DISTWIDTH] IN BLOOD BY AUTOMATED COUNT: 12.2 % (ref 11.6–15.1)
GFR SERPL CREATININE-BSD FRML MDRD: 75 ML/MIN/1.73SQ M
GLUCOSE P FAST SERPL-MCNC: 90 MG/DL (ref 65–99)
HCT VFR BLD AUTO: 42.4 % (ref 34.8–46.1)
HDLC SERPL-MCNC: 40 MG/DL
HGB BLD-MCNC: 14.8 G/DL (ref 11.5–15.4)
IMM GRANULOCYTES # BLD AUTO: 0.03 THOUSAND/UL (ref 0–0.2)
IMM GRANULOCYTES NFR BLD AUTO: 0 % (ref 0–2)
LDLC SERPL CALC-MCNC: 127 MG/DL (ref 0–100)
LYMPHOCYTES # BLD AUTO: 3.48 THOUSANDS/ÂΜL (ref 0.6–4.47)
LYMPHOCYTES NFR BLD AUTO: 34 % (ref 14–44)
MCH RBC QN AUTO: 33.3 PG (ref 26.8–34.3)
MCHC RBC AUTO-ENTMCNC: 34.9 G/DL (ref 31.4–37.4)
MCV RBC AUTO: 95 FL (ref 82–98)
MONOCYTES # BLD AUTO: 0.65 THOUSAND/ÂΜL (ref 0.17–1.22)
MONOCYTES NFR BLD AUTO: 6 % (ref 4–12)
NEUTROPHILS # BLD AUTO: 5.94 THOUSANDS/ÂΜL (ref 1.85–7.62)
NEUTS SEG NFR BLD AUTO: 59 % (ref 43–75)
NONHDLC SERPL-MCNC: 174 MG/DL
NRBC BLD AUTO-RTO: 0 /100 WBCS
PLATELET # BLD AUTO: 397 THOUSANDS/UL (ref 149–390)
PMV BLD AUTO: 9.4 FL (ref 8.9–12.7)
POTASSIUM SERPL-SCNC: 3.9 MMOL/L (ref 3.5–5.3)
PROT SERPL-MCNC: 7.4 G/DL (ref 6.4–8.4)
RBC # BLD AUTO: 4.45 MILLION/UL (ref 3.81–5.12)
SODIUM SERPL-SCNC: 137 MMOL/L (ref 135–147)
TRIGL SERPL-MCNC: 234 MG/DL
WBC # BLD AUTO: 10.18 THOUSAND/UL (ref 4.31–10.16)

## 2023-06-28 PROCEDURE — 36415 COLL VENOUS BLD VENIPUNCTURE: CPT

## 2023-06-28 PROCEDURE — 80061 LIPID PANEL: CPT

## 2023-06-28 PROCEDURE — 85025 COMPLETE CBC W/AUTO DIFF WBC: CPT

## 2023-06-28 PROCEDURE — 73700 CT LOWER EXTREMITY W/O DYE: CPT

## 2023-06-28 PROCEDURE — G1004 CDSM NDSC: HCPCS

## 2023-06-28 PROCEDURE — 80053 COMPREHEN METABOLIC PANEL: CPT

## 2023-07-04 ENCOUNTER — HOSPITAL ENCOUNTER (EMERGENCY)
Facility: HOSPITAL | Age: 43
Discharge: HOME | End: 2023-07-05
Attending: EMERGENCY MEDICINE
Payer: COMMERCIAL

## 2023-07-04 ENCOUNTER — APPOINTMENT (EMERGENCY)
Dept: RADIOLOGY | Facility: HOSPITAL | Age: 43
End: 2023-07-04
Payer: COMMERCIAL

## 2023-07-04 VITALS
TEMPERATURE: 97.8 F | DIASTOLIC BLOOD PRESSURE: 73 MMHG | RESPIRATION RATE: 18 BRPM | OXYGEN SATURATION: 98 % | SYSTOLIC BLOOD PRESSURE: 137 MMHG | HEART RATE: 70 BPM

## 2023-07-04 DIAGNOSIS — M54.2 NECK PAIN: Primary | ICD-10-CM

## 2023-07-04 LAB
BASOPHILS # BLD AUTO: 0.04 THOUSANDS/ÂΜL (ref 0–0.1)
BASOPHILS NFR BLD AUTO: 0 % (ref 0–1)
EOSINOPHIL # BLD AUTO: 0.1 THOUSAND/ÂΜL (ref 0–0.61)
EOSINOPHIL NFR BLD AUTO: 1 % (ref 0–6)
ERYTHROCYTE [DISTWIDTH] IN BLOOD BY AUTOMATED COUNT: 12.3 % (ref 11.6–15.1)
HCT VFR BLD AUTO: 42.5 % (ref 34.8–46.1)
HGB BLD-MCNC: 14.2 G/DL (ref 11.5–15.4)
IMM GRANULOCYTES # BLD AUTO: 0.03 THOUSAND/UL (ref 0–0.2)
IMM GRANULOCYTES NFR BLD AUTO: 0 % (ref 0–2)
LYMPHOCYTES # BLD AUTO: 2.73 THOUSANDS/ÂΜL (ref 0.6–4.47)
LYMPHOCYTES NFR BLD AUTO: 26 % (ref 14–44)
MCH RBC QN AUTO: 32.3 PG (ref 26.8–34.3)
MCHC RBC AUTO-ENTMCNC: 33.4 G/DL (ref 31.4–37.4)
MCV RBC AUTO: 97 FL (ref 82–98)
MONOCYTES # BLD AUTO: 0.56 THOUSAND/ÂΜL (ref 0.17–1.22)
MONOCYTES NFR BLD AUTO: 5 % (ref 4–12)
NEUTROPHILS # BLD AUTO: 6.89 THOUSANDS/ÂΜL (ref 1.85–7.62)
NEUTS SEG NFR BLD AUTO: 68 % (ref 43–75)
NRBC BLD AUTO-RTO: 0 /100 WBCS
PLATELET # BLD AUTO: 330 THOUSANDS/UL (ref 149–390)
PMV BLD AUTO: 9 FL (ref 8.9–12.7)
RBC # BLD AUTO: 4.39 MILLION/UL (ref 3.81–5.12)
WBC # BLD AUTO: 10.35 THOUSAND/UL (ref 4.31–10.16)

## 2023-07-04 PROCEDURE — 84484 ASSAY OF TROPONIN QUANT: CPT

## 2023-07-04 PROCEDURE — 71045 X-RAY EXAM CHEST 1 VIEW: CPT

## 2023-07-04 PROCEDURE — 36415 COLL VENOUS BLD VENIPUNCTURE: CPT

## 2023-07-04 PROCEDURE — 85025 COMPLETE CBC W/AUTO DIFF WBC: CPT

## 2023-07-04 PROCEDURE — 80053 COMPREHEN METABOLIC PANEL: CPT

## 2023-07-04 RX ORDER — ACETAMINOPHEN 325 MG/1
650 TABLET ORAL ONCE
Status: COMPLETED | OUTPATIENT
Start: 2023-07-04 | End: 2023-07-04

## 2023-07-04 RX ADMIN — ACETAMINOPHEN 650 MG: 325 TABLET, FILM COATED ORAL at 23:48

## 2023-07-04 NOTE — Clinical Note
Winter Younger was seen and treated in our emergency department on 7/4/2023. Diagnosis:     Winter  may return to work on return date. She may return on this date: 07/06/2023    Patient was seen and evaluated in the emergency department on 7/5/2023. Please excuse from work. If you have any questions or concerns, please don't hesitate to call.       Jerri Haq, DO    ______________________________           _______________          _______________  Hospital Representative                              Date                                Time

## 2023-07-05 LAB
ALBUMIN SERPL BCP-MCNC: 4.1 G/DL (ref 3.5–5)
ALP SERPL-CCNC: 66 U/L (ref 34–104)
ALT SERPL W P-5'-P-CCNC: 25 U/L (ref 7–52)
ANION GAP SERPL CALCULATED.3IONS-SCNC: 6 MMOL/L
AST SERPL W P-5'-P-CCNC: 14 U/L (ref 13–39)
BILIRUB SERPL-MCNC: 0.43 MG/DL (ref 0.2–1)
BUN SERPL-MCNC: 14 MG/DL (ref 5–25)
CALCIUM SERPL-MCNC: 9.6 MG/DL (ref 8.4–10.2)
CARDIAC TROPONIN I PNL SERPL HS: 2 NG/L
CHLORIDE SERPL-SCNC: 103 MMOL/L (ref 96–108)
CO2 SERPL-SCNC: 27 MMOL/L (ref 21–32)
CREAT SERPL-MCNC: 1 MG/DL (ref 0.6–1.3)
GFR SERPL CREATININE-BSD FRML MDRD: 68 ML/MIN/1.73SQ M
GLUCOSE SERPL-MCNC: 100 MG/DL (ref 65–140)
POTASSIUM SERPL-SCNC: 4.4 MMOL/L (ref 3.5–5.3)
PROT SERPL-MCNC: 6.8 G/DL (ref 6.4–8.4)
SODIUM SERPL-SCNC: 136 MMOL/L (ref 135–147)

## 2023-07-05 PROCEDURE — 93005 ELECTROCARDIOGRAM TRACING: CPT

## 2023-07-05 NOTE — DISCHARGE INSTRUCTIONS
Your work-up in the emergency department was unremarkable. You have been instructed to follow-up with your PCP on an outpatient basis for continuing evaluation and management. You may take Tylenol and Motrin alternating every 3-4 hours as needed for pain. Please take Motrin with small meals to avoid upset stomach. Should you develop worsening pain or fever uncontrolled with medication, difficulty swallowing, drooling, difficulty breathing, lightheadedness or any other symptoms that you find concerning please return to the emergency department immediately.

## 2023-07-05 NOTE — ED PROVIDER NOTES
History  Chief Complaint   Patient presents with   • Neck Pain     Pt reports left sided neck pain starting around 530 this afternoon. Denies numbness, tingling, visual disturbances or headache. Took ibuprofen around 7pm.      The patient is a 76-year-old female with past medical history of asthma and hypertension who presents to the emergency department with complaint of left-sided neck pain. The patient states she was working at her desk when she developed sudden sharp left-sided jaw pain at approximately 1730 this afternoon. She denied any inciting event such as trauma or twisting of her neck. She states that she attempted to ignore it but the pain has slowly spread down into the soft tissue on the left side of her neck. She took 400 mg of Motrin at 1900 but the pain has not resolved. She states that she is now beginning to develop a mild headache. She denies lightheadedness, change in vision, numbness, tingling, weakness, decreased range of motion of her cervical spine, midline cervical spine tenderness, difficulty swallowing, difficulty breathing, drooling, sore throat, swelling, chest pain, shortness of breath, nausea, vomiting, abdominal pain, diarrhea, hematuria, dysuria, rash or fever. Patient does inform me that she had chickenpox when she was smaller and has had shingles in the past.          Prior to Admission Medications   Prescriptions Last Dose Informant Patient Reported? Taking?    PARoxetine (PAXIL) 20 mg tablet   Yes No   PARoxetine (PAXIL) 30 mg tablet   Yes No   Sig: Take 30 mg by mouth every morning   albuterol (PROVENTIL HFA,VENTOLIN HFA) 90 mcg/act inhaler   Yes No   Sig: INHALE 2 PUFFS EVERY 4 HOURS AS NEEDED FOR WHEEZING OR SHORTNESS OF BREATH   cetirizine (ZyrTEC) 10 mg tablet   Yes No   Sig: Take 10 mg by mouth daily   clotrimazole (LOTRIMIN) 1 % cream   No No   Sig: Apply topically 2 (two) times a day   ferrous sulfate 325 (65 Fe) mg tablet   Yes No   Sig: Take 1 tablet by mouth 3 (three) times a day with meals   gabapentin (NEURONTIN) 100 mg capsule   Yes No   Sig: Take 300 mg by mouth   gabapentin (NEURONTIN) 600 MG tablet   Yes No   Sig: daily at bedtime   methylPREDNISolone 4 MG tablet therapy pack   No No   Sig: Use as directed on package   mirtazapine (REMERON) 15 mg tablet   Yes No   Patient not taking: Reported on 6/28/2022   omeprazole (PriLOSEC) 20 mg delayed release capsule   Yes No   Sig: Take 20 mg by mouth daily   vitamin B-12 (VITAMIN B-12) 1,000 mcg tablet   Yes No   Sig: Take 1,000 mcg by mouth daily      Facility-Administered Medications: None       Past Medical History:   Diagnosis Date   • Asthma    • Cigarette smoker    • GERD (gastroesophageal reflux disease)    • Graves disease    • Graves disease    • Hypertension        Past Surgical History:   Procedure Laterality Date   • HYSTERECTOMY     • MT CORRJ HALLUX VALGUS W/SESMDC W/DIST METAR OSTEOT Right 2/2/2023    Procedure: BUNIONECTOMY RAJI minimally invasive with great toe osteotomy;  Surgeon: Nelson Hanks DPM;  Location: [facility] MAIN OR;  Service: Podiatry   • ROBOTIC ASSISTED HYSTERECTOMY Bilateral 05/10/2022    removal of both fallopian tubes and cervix   • TUBAL LIGATION         Family History   Problem Relation Age of Onset   • No Known Problems Mother      I have reviewed and agree with the history as documented. E-Cigarette/Vaping   • E-Cigarette Use Never User      E-Cigarette/Vaping Substances   • Nicotine No    • THC No    • CBD No    • Flavoring No    • Other No    • Unknown No      Social History     Tobacco Use   • Smoking status: Every Day     Packs/day: 1.00     Types: Cigarettes   • Smokeless tobacco: Never   Vaping Use   • Vaping Use: Never used   Substance Use Topics   • Alcohol use: No   • Drug use: No        Review of Systems   Constitutional: Negative for chills, fatigue and fever. HENT: Negative for congestion, ear pain and sore throat.     Eyes: Negative for photophobia, pain and visual disturbance. Respiratory: Negative for cough, shortness of breath, wheezing and stridor. Cardiovascular: Negative for chest pain, palpitations and leg swelling. Gastrointestinal: Negative for abdominal distention, abdominal pain, constipation, diarrhea, nausea and vomiting. Endocrine: Negative. Genitourinary: Negative for dysuria and hematuria. Musculoskeletal: Positive for myalgias and neck pain. Negative for arthralgias, back pain and neck stiffness. Skin: Negative for color change and rash. Allergic/Immunologic: Negative. Neurological: Positive for headaches. Negative for dizziness, seizures, syncope, weakness, light-headedness and numbness. Hematological: Negative. Psychiatric/Behavioral: Negative. All other systems reviewed and are negative. Physical Exam  ED Triage Vitals [07/04/23 2252]   Temperature Pulse Respirations Blood Pressure SpO2   97.8 °F (36.6 °C) 70 18 137/73 98 %      Temp Source Heart Rate Source Patient Position - Orthostatic VS BP Location FiO2 (%)   Oral Monitor -- Right arm --      Pain Score       6             Orthostatic Vital Signs  Vitals:    07/04/23 2252   BP: 137/73   Pulse: 70       Physical Exam  Vitals and nursing note reviewed. Constitutional:       General: She is not in acute distress. Appearance: Normal appearance. She is well-developed. She is obese. She is ill-appearing. HENT:      Head: Normocephalic and atraumatic. Nose: Nose normal.      Mouth/Throat:      Mouth: Mucous membranes are moist.      Pharynx: Oropharynx is clear. Eyes:      Extraocular Movements: Extraocular movements intact. Conjunctiva/sclera: Conjunctivae normal.      Pupils: Pupils are equal, round, and reactive to light. Cardiovascular:      Rate and Rhythm: Normal rate and regular rhythm. Pulses: Normal pulses. Heart sounds: Normal heart sounds. No murmur heard. No friction rub.    Pulmonary:      Effort: Pulmonary effort is normal. No respiratory distress. Breath sounds: Normal breath sounds. No stridor. No wheezing, rhonchi or rales. Abdominal:      General: Abdomen is flat. Bowel sounds are normal. There is no distension. Palpations: Abdomen is soft. Tenderness: There is no abdominal tenderness. There is no right CVA tenderness, left CVA tenderness, guarding or rebound. Musculoskeletal:         General: No swelling or tenderness. Normal range of motion. Cervical back: Normal range of motion and neck supple. No rigidity. Right lower leg: No edema. Left lower leg: No edema. Lymphadenopathy:      Cervical: No cervical adenopathy. Skin:     General: Skin is warm and dry. Capillary Refill: Capillary refill takes less than 2 seconds. Coloration: Skin is not jaundiced. Findings: No erythema or rash. Neurological:      General: No focal deficit present. Mental Status: She is alert and oriented to person, place, and time. Mental status is at baseline. Cranial Nerves: No cranial nerve deficit. Sensory: No sensory deficit. Motor: No weakness.    Psychiatric:         Mood and Affect: Mood normal.         ED Medications  Medications   acetaminophen (TYLENOL) tablet 650 mg (650 mg Oral Given 7/4/23 2348)       Diagnostic Studies  Results Reviewed     Procedure Component Value Units Date/Time    HS Troponin 0hr (reflex protocol) [[id number]]  (Normal) Collected: 07/04/23 2344    Lab Status: Final result Specimen: Blood from Arm, Left Updated: 07/05/23 0014     hs TnI 0hr 2 ng/L     HS Troponin I 2hr [[id number]]     Lab Status: No result Specimen: Blood     HS Troponin I 4hr [[id number]]     Lab Status: No result Specimen: Blood     Comprehensive metabolic panel [[id number]] Collected: 07/04/23 2344    Lab Status: Final result Specimen: Blood from Arm, Left Updated: 07/05/23 0010     Sodium 136 mmol/L      Potassium 4.4 mmol/L      Chloride 103 mmol/L      CO2 27 mmol/L ANION GAP 6 mmol/L      BUN 14 mg/dL      Creatinine 1.00 mg/dL      Glucose 100 mg/dL      Calcium 9.6 mg/dL      AST 14 U/L      ALT 25 U/L      Alkaline Phosphatase 66 U/L      Total Protein 6.8 g/dL      Albumin 4.1 g/dL      Total Bilirubin 0.43 mg/dL      eGFR 68 ml/min/1.73sq m     Narrative:      [facility] guidelines for Chronic Kidney Disease (CKD):   •  Stage 1 with normal or high GFR (GFR > 90 mL/min/1.73 square meters)  •  Stage 2 Mild CKD (GFR = 60-89 mL/min/1.73 square meters)  •  Stage 3A Moderate CKD (GFR = 45-59 mL/min/1.73 square meters)  •  Stage 3B Moderate CKD (GFR = 30-44 mL/min/1.73 square meters)  •  Stage 4 Severe CKD (GFR = 15-29 mL/min/1.73 square meters)  •  Stage 5 End Stage CKD (GFR <15 mL/min/1.73 square meters)  Note: GFR calculation is accurate only with a steady state creatinine    CBC and differential [[id number]]  (Abnormal) Collected: 07/04/23 2344    Lab Status: Final result Specimen: Blood from Arm, Left Updated: 07/04/23 2352     WBC 10.35 Thousand/uL      RBC 4.39 Million/uL      Hemoglobin 14.2 g/dL      Hematocrit 42.5 %      MCV 97 fL      MCH 32.3 pg      MCHC 33.4 g/dL      RDW 12.3 %      MPV 9.0 fL      Platelets 548 Thousands/uL      nRBC 0 /100 WBCs      Neutrophils Relative 68 %      Immat GRANS % 0 %      Lymphocytes Relative 26 %      Monocytes Relative 5 %      Eosinophils Relative 1 %      Basophils Relative 0 %      Neutrophils Absolute 6.89 Thousands/µL      Immature Grans Absolute 0.03 Thousand/uL      Lymphocytes Absolute 2.73 Thousands/µL      Monocytes Absolute 0.56 Thousand/µL      Eosinophils Absolute 0.10 Thousand/µL      Basophils Absolute 0.04 Thousands/µL                  XR chest 1 view portable   ED Interpretation by Rick Peterson DO (07/04 2354)   No acute cardiopulmonary disease per my interpretation.             Procedures  ECG 12 Lead Documentation Only    Date/Time: 7/5/2023 12:33 AM    Performed by: Fang Burch Leilani Erwin DO  Authorized by: Zahida Choi DO    Indications / Diagnosis:  Neck pain/ jaw pain  ECG reviewed by me, the ED Provider: yes    Patient location:  ED  Previous ECG:     Previous ECG:  Compared to current    Similarity:  No change    Comparison to cardiac monitor: No    Interpretation:     Interpretation: abnormal    Rate:     ECG rate:  59    ECG rate assessment: bradycardic    Rhythm:     Rhythm: sinus bradycardia    Ectopy:     Ectopy: none    QRS:     QRS axis:  Normal    QRS intervals:  Normal  Conduction:     Conduction: normal    ST segments:     ST segments:  Normal  T waves:     T waves: inverted      Inverted:  AVL and V2  Comments:      Sinus bradycardia with inverted T waves in aVL and V2. Unchanged from previous tracing. No evidence of acute ischemia or STEMI. We are assessing troponin. ED Course  ED Course as of 07/05/23 0146   Tue Jul 04, 2023   2354 CBC and differential(!)  Mild leukocytosis noted. 2354 XR chest 1 view portable  No acute cardiopulmonary disease per my interpretation. Wed Jul 05, 2023   0121 HS Troponin 0hr (reflex protocol)  Unremarkable. We will discontinue the 2-hour repeat.   0122 Comprehensive metabolic panel  Within normal limits   0143 ECG 12 lead  Sinus bradycardia with inverted T waves in aVL and V2. Unchanged from previous tracing. No evidence of acute ischemia or STEMI. We are assessing troponin. 3044 The patient's work-up in the emergency department was unremarkable. She will be discharged with instructions to follow-up with her PCP on an outpatient basis. Return precautions will be discussed. Further instructions per discharge orders. SBIRT 22yo+    Flowsheet Row Most Recent Value   Initial Alcohol Screen: US AUDIT-C     1. How often do you have a drink containing alcohol? 0 Filed at: 07/04/2023 6472   2. How many drinks containing alcohol do you have on a typical day you are drinking?   0 Filed at: 07/04/2023 2254   3a. Male UNDER 65: How often do you have five or more drinks on one occasion? 0 Filed at: 07/04/2023 2254   3b. FEMALE Any Age, or MALE 65+: How often do you have 4 or more drinks on one occassion? 0 Filed at: 07/04/2023 2254   Audit-C Score 0 Filed at: 07/04/2023 2254   JOSSIE: How many times in the past year have you. .. Used an illegal drug or used a prescription medication for non-medical reasons? Never Filed at: 07/04/2023 2254                Medical Decision Making  The patient is a 58-year-old female with past medical history of asthma and hypertension who presents to the emergency department with complaint of left-sided neck pain. Upon initial presentation the patient was alert and oriented x4 and in no acute distress. Upon physical exam there is tenderness to palpation in the soft tissue on the left lateral aspect of her neck anteriorly. There is no evidence of erythema, rash or edema. The remainder of her physical exam was grossly unremarkable. The differential includes but is not limited to ACS, musculoskeletal strain or zoster. I have ordered a EKG, chest x-ray, CBC, CMP and troponin. Have also ordered Tylenol for pain management. I will reassess the patient posttreatment. Results pending. Amount and/or Complexity of Data Reviewed  Labs: ordered. Radiology: ordered. Risk  OTC drugs. Disposition  Final diagnoses:   Neck pain     Time reflects when diagnosis was documented in both MDM as applicable and the Disposition within this note     Time User Action Codes Description Comment    7/5/2023  1:44 AM Dorma Brain Add [M54.2] Neck pain       ED Disposition     ED Disposition   Discharge    Condition   Stable    Date/Time   Wed Jul 5, 2023  1:44 AM    Comment   Winter A Younger discharge to home/self care.                Follow-up Information     Follow up With Specialties Details Why Contact Info Additional Information    ITALIA Robertson Nurse Practitioner Schedule an appointment as soon as possible for a visit  For continued neck pain [phone] [facility] [address] PENNIE Cochran [address] Emergency Department Emergency Medicine  As needed [address] [facility], [place], [place], [address]          Patient's Medications   Discharge Prescriptions    No medications on file     No discharge procedures on file. PDMP Review       Value Time User    PDMP Reviewed  Yes 6/23/2022 10:37 PM Tiffany Roblero MD           ED Provider  Attending physically available and evaluated Debora A Younger. I managed the patient along with the ED Attending.     Electronically Signed by         Freda Augustin DO  07/05/23 5406

## 2023-07-07 LAB
ATRIAL RATE: 59 BPM
P AXIS: 73 DEGREES
PR INTERVAL: 132 MS
QRS AXIS: 84 DEGREES
QRSD INTERVAL: 90 MS
QT INTERVAL: 464 MS
QTC INTERVAL: 459 MS
T WAVE AXIS: 73 DEGREES
VENTRICULAR RATE: 59 BPM

## 2023-07-07 PROCEDURE — 93010 ELECTROCARDIOGRAM REPORT: CPT | Performed by: INTERNAL MEDICINE

## 2023-07-19 ENCOUNTER — OFFICE VISIT (OUTPATIENT)
Dept: PODIATRY | Facility: CLINIC | Age: 43
End: 2023-07-19
Payer: COMMERCIAL

## 2023-07-19 VITALS
HEIGHT: 68 IN | SYSTOLIC BLOOD PRESSURE: 125 MMHG | DIASTOLIC BLOOD PRESSURE: 78 MMHG | WEIGHT: 198 LBS | HEART RATE: 84 BPM | BODY MASS INDEX: 30.01 KG/M2

## 2023-07-19 DIAGNOSIS — M84.374A STRESS FRACTURE OF METATARSAL BONE OF RIGHT FOOT, INITIAL ENCOUNTER: Primary | ICD-10-CM

## 2023-07-19 DIAGNOSIS — B35.3 TINEA PEDIS OF BOTH FEET: ICD-10-CM

## 2023-07-19 DIAGNOSIS — G89.29 CHRONIC FOOT PAIN, RIGHT: ICD-10-CM

## 2023-07-19 DIAGNOSIS — M79.671 CHRONIC FOOT PAIN, RIGHT: ICD-10-CM

## 2023-07-19 PROCEDURE — 20550 NJX 1 TENDON SHEATH/LIGAMENT: CPT | Performed by: PODIATRIST

## 2023-07-19 PROCEDURE — 99214 OFFICE O/P EST MOD 30 MIN: CPT | Performed by: PODIATRIST

## 2023-07-19 RX ORDER — TRIAMCINOLONE ACETONIDE 40 MG/ML
40 INJECTION, SUSPENSION INTRA-ARTICULAR; INTRAMUSCULAR ONCE
Status: COMPLETED | OUTPATIENT
Start: 2023-07-19 | End: 2023-07-19

## 2023-07-19 RX ORDER — LIDOCAINE HYDROCHLORIDE 10 MG/ML
1 INJECTION, SOLUTION INFILTRATION; PERINEURAL ONCE
Status: COMPLETED | OUTPATIENT
Start: 2023-07-19 | End: 2023-07-19

## 2023-07-19 RX ORDER — KETOCONAZOLE 20 MG/G
CREAM TOPICAL DAILY
Qty: 60 G | Refills: 3 | Status: SHIPPED | OUTPATIENT
Start: 2023-07-19

## 2023-07-19 RX ADMIN — LIDOCAINE HYDROCHLORIDE 1 ML: 10 INJECTION, SOLUTION INFILTRATION; PERINEURAL at 11:04

## 2023-07-19 RX ADMIN — TRIAMCINOLONE ACETONIDE 40 MG: 40 INJECTION, SUSPENSION INTRA-ARTICULAR; INTRAMUSCULAR at 11:02

## 2023-07-19 NOTE — PROGRESS NOTES
Assessment/Plan:      Diagnoses and all orders for this visit:    Stress fracture of metatarsal bone of right foot, initial encounter  -     triamcinolone acetonide (KENALOG-40) 40 mg/mL injection 40 mg  -     lidocaine (XYLOCAINE) 1 % injection 1 mL  -     NM bone scan 3 phase; Future  -     Foot injection    Tinea pedis of both feet  -     ketoconazole (NIZORAL) 2 % cream; Apply topically daily    Chronic foot pain, right  -     NM bone scan 3 phase; Future  -     Foot injection        Diagnosis and options discussed with patient  Patient agreeable to the plan as stated below    PAtients right foot is not not getting any better with immobilization, rest and time. Her CT did show healing stress fracture but her pain is much worse than I would expect over 2 months post injury. I did a cortisdone shot into the1st space and the lidocaine did improve pain almost immediately. If pain returns, I ordered a bone scan. Foot injection     Date/Time 7/19/2023 10:45 AM     Performed by  Viviana Oshea DPM   Authorized by Viviana Oshea DPM     Universal Protocol   Consent: Verbal consent obtained. Risks and benefits: risks, benefits and alternatives were discussed  Consent given by: patient  Time out: Immediately prior to procedure a "time out" was called to verify the correct patient, procedure, equipment, support staff and site/side marked as required. Timeout called at: 7/19/2023 10:30 AM.  Patient understanding: patient states understanding of the procedure being performed  Patient identity confirmed: verbally with patient        Local anesthesia used: yes     Anesthesia   Local anesthesia used: yes  Local Anesthetic: lidocaine 1% without epinephrine  Anesthetic total: 1 mL     Procedure Details   Procedure Notes: 0.5cc kenalog 40 and 2cc 1% lidocaine plain injected into right first interspace. Subjective:     Patient ID: Micki Modi Younger is a 39 y.o. female.     PAtient has severe pain from right 2nd metatarsal stress fracture, confirmed on CT last month. She has been in CAM over 2 months but NOT SEEING ANY IMPROVEMENT IN PAIN. She reportds severe pain over the 2,3 metatarsals. The bunion does not hurt her at all not does the surgical site. Review of Systems    Constitutional: Negative. Respiratory: Negative for cough and shortness of breath. Gastrointestinal: Negative for diarrhea, nausea and vomiting. Musculoskeletal: right foot pain  Skin: Negative for rash or wound. Neurological: Negative for weakness, numbness and headaches. Objective:     Physical Exam  Vitals reviewed. Constitutional:       Appearance: She is obese. She is not ill-appearing or diaphoretic. Cardiovascular:      Rate and Rhythm: Normal rate. Pulses: Normal pulses. Pulmonary:      Effort: Pulmonary effort is normal. No respiratory distress. Musculoskeletal:         General: Tenderness (severe pain 2nd metatarsal shaft with pressure. No first ray pain or deformity) present. Skin:     Capillary Refill: Capillary refill takes less than 2 seconds. Findings: Rash (red vesicular rsh both feet with pruritis) present. No erythema. Neurological:      Mental Status: She is alert and oriented to person, place, and time. Sensory: No sensory deficit.       Gait: Gait normal.   Psychiatric:         Mood and Affect: Mood normal.

## 2023-08-11 ENCOUNTER — HOSPITAL ENCOUNTER (OUTPATIENT)
Dept: NUCLEAR MEDICINE | Facility: HOSPITAL | Age: 43
Discharge: HOME | End: 2023-08-11
Attending: PODIATRIST
Payer: COMMERCIAL

## 2023-08-11 DIAGNOSIS — M79.671 CHRONIC FOOT PAIN, RIGHT: ICD-10-CM

## 2023-08-11 DIAGNOSIS — G89.29 CHRONIC FOOT PAIN, RIGHT: ICD-10-CM

## 2023-08-11 DIAGNOSIS — M84.374A STRESS FRACTURE OF METATARSAL BONE OF RIGHT FOOT, INITIAL ENCOUNTER: ICD-10-CM

## 2023-08-11 PROCEDURE — A9503 TC99M MEDRONATE: HCPCS

## 2023-08-11 PROCEDURE — G1004 CDSM NDSC: HCPCS

## 2023-08-11 PROCEDURE — 78315 BONE IMAGING 3 PHASE: CPT

## 2023-08-16 ENCOUNTER — OFFICE VISIT (OUTPATIENT)
Dept: PODIATRY | Facility: CLINIC | Age: 43
End: 2023-08-16
Payer: COMMERCIAL

## 2023-08-16 VITALS
DIASTOLIC BLOOD PRESSURE: 84 MMHG | HEIGHT: 68 IN | WEIGHT: 200 LBS | HEART RATE: 73 BPM | BODY MASS INDEX: 30.31 KG/M2 | SYSTOLIC BLOOD PRESSURE: 132 MMHG

## 2023-08-16 DIAGNOSIS — M79.671 CHRONIC FOOT PAIN, RIGHT: Primary | ICD-10-CM

## 2023-08-16 DIAGNOSIS — M19.079 ARTHRITIS OF MIDFOOT: ICD-10-CM

## 2023-08-16 DIAGNOSIS — G89.29 CHRONIC FOOT PAIN, RIGHT: Primary | ICD-10-CM

## 2023-08-16 DIAGNOSIS — M20.11 HALLUX ABDUCTO VALGUS, RIGHT: ICD-10-CM

## 2023-08-16 DIAGNOSIS — T84.84XA PAINFUL ORTHOPAEDIC HARDWARE (HCC): ICD-10-CM

## 2023-08-16 PROCEDURE — 99214 OFFICE O/P EST MOD 30 MIN: CPT | Performed by: PODIATRIST

## 2023-08-16 RX ORDER — GABAPENTIN 300 MG/1
CAPSULE ORAL
COMMUNITY
Start: 2023-07-19

## 2023-08-16 RX ORDER — CLONAZEPAM 0.5 MG/1
0.5 TABLET ORAL
COMMUNITY
Start: 2023-07-19 | End: 2024-01-15

## 2023-08-16 NOTE — PROGRESS NOTES
Assessment/Plan:       Diagnoses and all orders for this visit:    Chronic foot pain, right  -     Ambulatory referral to Physical Therapy; Future    Hallux abducto valgus, right  -     Ambulatory referral to Physical Therapy; Future    Arthritis of midfoot  -     Ambulatory referral to Physical Therapy; Future    Painful orthopaedic hardware [facility])  -     Case request operating room: REMOVAL HARDWARE FOOT; Standing  -     Case request operating room: REMOVAL HARDWARE FOOT    Other orders  -     clonazePAM (KlonoPIN) 0.5 mg tablet; Take 0.5 mg by mouth  -     gabapentin (NEURONTIN) 300 mg capsule  -     Incentive spirometry; Standing  -     Insert and maintain IV line; Standing  -     Void On-Call to O.R.; Standing  -     Nursing Communication Warming Interventions Implemented; Standing  -     Nursing Communication CHG bath, have staff wash entire body (neck down) per pre-op bathing protocol. Routine, evening prior to, and day of surgery.; Standing  -     Place sequential compression device; Standing  -     ceFAZolin (ANCEF) 2,000 mg in dextrose 5 % 100 mL IVPB      Diagnosis and options discussed with patient  Patient agreeable to the plan as stated below  Reviewed bone scan and again reviewed CT images. Patients pain is unusual and not resolving. Her bone scan does not tell me much other than there is still bone remodelling at the previous osteotomy and 2nd stress fracture. NO evidence for CRPS. The screws are slightly prominent. We discussed removing the screws but I cannot guarantee this will alleviate her midfoot and forefoot pain. My other thought is getting her custom orthotics for better arch supports. She does have some degenerative changes at the TMJ and the stress fracture is a sign of foot imbalance. Consent was signed for removal hardware right foot    Surgical procedure and recovery discussed as can best be predicted.   Alternatives, risks, complications covered with the patient. Appropriate postop medication discussed, educated patient on narcotic medication and its risks. Patient will be sent for preoperative testing and clearance    Patient doen not need DVT prophylaxis for this procedure      Subjective:      Patient ID: Ibeth Meeks Younger is a 39 y.o. female. PAtient has chronic stress fracture right foot and bunion surgery last year. She was still getting severe pain even after the stress fracture healed. She was sent for a bone scan and is here to review results. Her pain is not improving and its throughout the forefoot and midfoot. She reports NO PAIN at the first MTPJ where she had bunion surgery      The following portions of the patient's history were reviewed and updated as appropriate: allergies, current medications, past family history, past medical history, past social history, past surgical history and problem list.    Review of Systems    Constitutional: Negative. Respiratory: Negative for cough and shortness of breath. Gastrointestinal: Negative for diarrhea, nausea and vomiting. Musculoskeletal: continued foot pain   Skin: Negative for rash or wound. Neurological: Negative for weakness, numbness and headaches. Objective:      /84 (BP Location: Left arm, Patient Position: Sitting, Cuff Size: Large)   Pulse 73   Ht 5' 8" (1.727 m) Comment: verbal  Wt 90.7 kg (200 lb)   LMP 02/21/2019   BMI 30.41 kg/m²          Physical Exam  Vitals reviewed. Constitutional:       Appearance: She is obese. She is not ill-appearing or diaphoretic. Cardiovascular:      Rate and Rhythm: Normal rate. Pulses: Normal pulses. Pulmonary:      Effort: Pulmonary effort is normal. No respiratory distress. Musculoskeletal:         General: Swelling and tenderness (sharp pain second ray at MTPJ and TMTJ. No first MTPJ pain, bunion correection stable. I am able to palpate the screw medial proximal as it is proud from the bone) present.    Skin: Capillary Refill: Capillary refill takes less than 2 seconds. Findings: No bruising, erythema or rash. Neurological:      Mental Status: She is alert.

## 2023-08-24 ENCOUNTER — ANESTHESIA EVENT (OUTPATIENT)
Dept: PERIOP | Facility: AMBULARY SURGERY CENTER | Age: 43
End: 2023-08-24
Payer: COMMERCIAL

## 2023-08-28 ENCOUNTER — TELEPHONE (OUTPATIENT)
Age: 43
End: 2023-08-28

## 2023-08-28 NOTE — TELEPHONE ENCOUNTER
Caller: Jordin from [facility]    Doctor/Office: Ave Fermin [facility]#:[phone]    Escalation: Care Patient went to [facility] without a H & P  Could you please fax it to  [phone] or [phone] Attention to  Charles Jones.   Thank you

## 2023-08-30 ENCOUNTER — TELEPHONE (OUTPATIENT)
Age: 43
End: 2023-08-30

## 2023-08-30 NOTE — TELEPHONE ENCOUNTER
Caller: BOB     Doctor: Dr Nataly Mart    Reason for call: only received a cover letter no H & P form please fax to [phone]    Call back#:Fax# [phone]

## 2023-09-05 NOTE — PRE-PROCEDURE INSTRUCTIONS
Pre-Surgery Instructions:   Medication Instructions   • albuterol (PROVENTIL HFA,VENTOLIN HFA) 90 mcg/act inhaler Uses PRN- OK to take day of surgery   • cetirizine (ZyrTEC) 10 mg tablet Take day of surgery. • clonazePAM (KlonoPIN) 0.5 mg tablet Take day of surgery. • clotrimazole (LOTRIMIN) 1 % cream Hold day of surgery. • gabapentin (NEURONTIN) 300 mg capsule Take night before surgery   • ketoconazole (NIZORAL) 2 % cream Hold day of surgery. • omeprazole (PriLOSEC) 20 mg delayed release capsule Take day of surgery. • PARoxetine (PAXIL) 30 mg tablet Take day of surgery. • vitamin B-12 (VITAMIN B-12) 1,000 mcg tablet Hold the day surgery    Medication instructions for day surgery reviewed. Please use only a sip of water to take your instructed medications. Avoid all over the counter vitamins, supplements and NSAIDS for one week prior to surgery per anesthesia guidelines. Tylenol is ok to take as needed. You will receive a call one business day prior to surgery with an arrival time and hospital directions. If your surgery is scheduled on a Monday, the hospital will be calling you on the Friday prior to your surgery. If you have not heard from anyone by 8pm, please call the hospital supervisor through the hospital  at [phone]. Katerina Cha [phone]). Do not eat or drink anything after midnight the night before your surgery, including candy, mints, lifesavers, or chewing gum. Do not drink alcohol 24hrs before your surgery. Try not to smoke at least 24hrs before your surgery. Follow the pre surgery showering instructions as listed in the [facility] Surgical Experience Booklet” or otherwise provided by your surgeon's office. Do not shave the surgical area 24 hours before surgery. Do not apply any lotions, creams, including makeup, cologne, deodorant, or perfumes after showering on the day of your surgery. No contact lenses, eye make-up, or artificial eyelashes.  Remove nail polish, including gel polish, and any artificial, gel, or acrylic nails if possible. Remove all jewelry including rings and body piercing jewelry. Wear causal clothing that is easy to take on and off. Consider your type of surgery. Keep any valuables, jewelry, piercings at home. Please bring any specially ordered equipment (sling, braces) if indicated. Arrange for a responsible person to drive you to and from the hospital on the day of your surgery. Visitor Guidelines discussed. Call the surgeon's office with any new illnesses, exposures, or additional questions prior to surgery. Please reference your [facility] Surgical Experience Booklet” for additional information to prepare for your upcoming surgery.

## 2023-09-07 ENCOUNTER — HOSPITAL ENCOUNTER (OUTPATIENT)
Facility: AMBULARY SURGERY CENTER | Age: 43
Setting detail: OUTPATIENT SURGERY
Discharge: HOME | End: 2023-09-07
Attending: PODIATRIST | Admitting: PODIATRIST
Payer: COMMERCIAL

## 2023-09-07 ENCOUNTER — APPOINTMENT (OUTPATIENT)
Dept: RADIOLOGY | Facility: AMBULARY SURGERY CENTER | Age: 43
End: 2023-09-07
Payer: COMMERCIAL

## 2023-09-07 ENCOUNTER — ANESTHESIA (OUTPATIENT)
Dept: PERIOP | Facility: AMBULARY SURGERY CENTER | Age: 43
End: 2023-09-07
Payer: COMMERCIAL

## 2023-09-07 VITALS
TEMPERATURE: 97 F | OXYGEN SATURATION: 97 % | WEIGHT: 198 LBS | BODY MASS INDEX: 30.11 KG/M2 | SYSTOLIC BLOOD PRESSURE: 118 MMHG | HEART RATE: 64 BPM | DIASTOLIC BLOOD PRESSURE: 73 MMHG | RESPIRATION RATE: 16 BRPM

## 2023-09-07 DIAGNOSIS — T84.84XA PAINFUL ORTHOPAEDIC HARDWARE (HCC): ICD-10-CM

## 2023-09-07 DIAGNOSIS — Z98.890 POST-OPERATIVE STATE: Primary | ICD-10-CM

## 2023-09-07 PROCEDURE — 87075 CULTR BACTERIA EXCEPT BLOOD: CPT | Performed by: PODIATRIST

## 2023-09-07 PROCEDURE — 73620 X-RAY EXAM OF FOOT: CPT

## 2023-09-07 PROCEDURE — 20680 REMOVAL OF IMPLANT DEEP: CPT | Performed by: PODIATRIST

## 2023-09-07 PROCEDURE — 87205 SMEAR GRAM STAIN: CPT | Performed by: PODIATRIST

## 2023-09-07 PROCEDURE — 99024 POSTOP FOLLOW-UP VISIT: CPT | Performed by: PODIATRIST

## 2023-09-07 PROCEDURE — 87070 CULTURE OTHR SPECIMN AEROBIC: CPT | Performed by: PODIATRIST

## 2023-09-07 RX ORDER — FENTANYL CITRATE/PF 50 MCG/ML
25 SYRINGE (ML) INJECTION
Status: DISCONTINUED | OUTPATIENT
Start: 2023-09-07 | End: 2023-09-07 | Stop reason: HOSPADM

## 2023-09-07 RX ORDER — MIDAZOLAM HYDROCHLORIDE 2 MG/2ML
INJECTION, SOLUTION INTRAMUSCULAR; INTRAVENOUS AS NEEDED
Status: DISCONTINUED | OUTPATIENT
Start: 2023-09-07 | End: 2023-09-07

## 2023-09-07 RX ORDER — FENTANYL CITRATE 50 UG/ML
INJECTION, SOLUTION INTRAMUSCULAR; INTRAVENOUS AS NEEDED
Status: DISCONTINUED | OUTPATIENT
Start: 2023-09-07 | End: 2023-09-07

## 2023-09-07 RX ORDER — CEFAZOLIN SODIUM 2 G/50ML
2000 SOLUTION INTRAVENOUS ONCE
Status: COMPLETED | OUTPATIENT
Start: 2023-09-07 | End: 2023-09-07

## 2023-09-07 RX ORDER — ACETAMINOPHEN 325 MG/1
650 TABLET ORAL EVERY 4 HOURS PRN
Status: CANCELLED | OUTPATIENT
Start: 2023-09-07

## 2023-09-07 RX ORDER — SODIUM CHLORIDE, SODIUM LACTATE, POTASSIUM CHLORIDE, CALCIUM CHLORIDE 600; 310; 30; 20 MG/100ML; MG/100ML; MG/100ML; MG/100ML
125 INJECTION, SOLUTION INTRAVENOUS CONTINUOUS
Status: DISCONTINUED | OUTPATIENT
Start: 2023-09-07 | End: 2023-09-07 | Stop reason: HOSPADM

## 2023-09-07 RX ORDER — SODIUM CHLORIDE, SODIUM LACTATE, POTASSIUM CHLORIDE, CALCIUM CHLORIDE 600; 310; 30; 20 MG/100ML; MG/100ML; MG/100ML; MG/100ML
50 INJECTION, SOLUTION INTRAVENOUS CONTINUOUS
Status: DISCONTINUED | OUTPATIENT
Start: 2023-09-07 | End: 2023-09-07 | Stop reason: HOSPADM

## 2023-09-07 RX ORDER — PROPOFOL 10 MG/ML
INJECTION, EMULSION INTRAVENOUS CONTINUOUS PRN
Status: DISCONTINUED | OUTPATIENT
Start: 2023-09-07 | End: 2023-09-07

## 2023-09-07 RX ORDER — SODIUM CHLORIDE, SODIUM LACTATE, POTASSIUM CHLORIDE, CALCIUM CHLORIDE 600; 310; 30; 20 MG/100ML; MG/100ML; MG/100ML; MG/100ML
INJECTION, SOLUTION INTRAVENOUS CONTINUOUS PRN
Status: DISCONTINUED | OUTPATIENT
Start: 2023-09-07 | End: 2023-09-07

## 2023-09-07 RX ORDER — OXYCODONE HYDROCHLORIDE AND ACETAMINOPHEN 5; 325 MG/1; MG/1
1 TABLET ORAL EVERY 4 HOURS PRN
Status: CANCELLED | OUTPATIENT
Start: 2023-09-07

## 2023-09-07 RX ORDER — OXYCODONE HYDROCHLORIDE AND ACETAMINOPHEN 5; 325 MG/1; MG/1
1 TABLET ORAL EVERY 4 HOURS PRN
Qty: 15 TABLET | Refills: 0 | Status: SHIPPED | OUTPATIENT
Start: 2023-09-07 | End: 2023-09-17

## 2023-09-07 RX ORDER — ONDANSETRON 2 MG/ML
4 INJECTION INTRAMUSCULAR; INTRAVENOUS ONCE AS NEEDED
Status: DISCONTINUED | OUTPATIENT
Start: 2023-09-07 | End: 2023-09-07 | Stop reason: HOSPADM

## 2023-09-07 RX ORDER — ACETAMINOPHEN 325 MG/1
975 TABLET ORAL ONCE
Status: COMPLETED | OUTPATIENT
Start: 2023-09-07 | End: 2023-09-07

## 2023-09-07 RX ADMIN — CEFAZOLIN SODIUM 2000 MG: 2 SOLUTION INTRAVENOUS at 11:26

## 2023-09-07 RX ADMIN — SODIUM CHLORIDE, SODIUM LACTATE, POTASSIUM CHLORIDE, AND CALCIUM CHLORIDE: .6; .31; .03; .02 INJECTION, SOLUTION INTRAVENOUS at 11:21

## 2023-09-07 RX ADMIN — ACETAMINOPHEN 975 MG: 325 TABLET, FILM COATED ORAL at 09:54

## 2023-09-07 RX ADMIN — FENTANYL CITRATE 50 MCG: 50 INJECTION INTRAMUSCULAR; INTRAVENOUS at 11:25

## 2023-09-07 RX ADMIN — FENTANYL CITRATE 50 MCG: 50 INJECTION INTRAMUSCULAR; INTRAVENOUS at 11:48

## 2023-09-07 RX ADMIN — PROPOFOL 100 MCG/KG/MIN: 10 INJECTION, EMULSION INTRAVENOUS at 11:25

## 2023-09-07 RX ADMIN — MIDAZOLAM 2 MG: 1 INJECTION INTRAMUSCULAR; INTRAVENOUS at 11:21

## 2023-09-07 NOTE — OP NOTE
OPERATIVE REPORT - Podiatry  PATIENT NAME: Debora Morfin    :  1980  MRN: [id number]  Pt Location: [facility] OR ROOM 01    SURGERY DATE: 2023    Surgeon(s) and Role:     * Bruna Ellis DPM - Primary     * Desire Anderson DPM - Assisting    Pre-op Diagnosis:  Painful orthopaedic hardware [facility]) Tamsen Karin    Post-Op Diagnosis Codes:     * Painful orthopaedic hardware ([address]) Tamsen Karin    Procedure(s) (LRB):  REMOVAL HARDWARE FOOT (Right)    Specimen(s):  ID Type Source Tests Collected by Time Destination   A : CULTURE OF HARDWARE RIGHT ANKLE Wound Ankle ANAEROBIC CULTURE AND GRAM STAIN, WOUND CULTURE Bruna Ellis DPM 2023 1142        Estimated Blood Loss:   Minimal    Drains:  * No LDAs found *    Anesthesia Type:   IV Sedation with Anesthesia with 20 ml of 1% Lidocaine and 0.5% Bupivacaine in a 1:1 mixture    Hemostasis:  Pneumatic ankle tourniquet set at 250 mmHg for 14 mins  Direct compression, electrocautery    Materials:  3-0 vicryl  3-0 nylon    Injectables:  None    Operative Findings:   2 screws were removed from first metatarsal, and deep anaerobic and aerobic cultures were taken. No clinical signs of infection noted. Complications:   None    Procedure and Technique:     Under mild sedation, the patient was brought into the operating room and placed on the operating room table in the supine position. IV sedation was achieved by anesthesia team and a universal timeout was performed where all parties are in agreement of correct patient, correct procedure and correct site. A pneumatic tourniquet was then placed over the patient's right lower extremity with ample padding. A lanier block was performed consisting of 20 ml of local anesthetic. The foot was then prepped and draped in the usual aseptic manner. An esmarch bandage was used to exsangunate the foot and the pneumatic tourniquet was then inflated to 250 mmHg.     Attention was then directed to right first metatarsal at area of previous surgical scar where there was palpable hardware. A 2 cm incision was made through skin. Further dissection was made with care to retract all vital neurovascular structures. Incision was taken down to hardware. 2 screws were removed from first metatarsal, and deep anaerobic and aerobic cultures were taken. No clinical signs of infection noted. The surgical incision was irrigated with copious amounts of normal sterile saline. The metatarsal was stressed under fluoroscopy, no motion at healed osteotomy site of the metatarsal seen. Subcutaneous closure was obtained utilizing 3-0 vicrtyl. Skin edges were reapproximated and closure was obtained utilizing 3-0 nylon. The foot was then cleansed and dried. The incision site was dressed with xeroform, gauze. This was then covered with a Clay and an ACE wrap. The tourniquet was deflated at approximately 14 min and normal hyperemic response was noted to all digits. The patient tolerated the procedure and anesthesia well without immediate complications and transferred to PACU with vital signs stable. Dr. John Parra was present during the entire procedure and participated in all key aspects. SIGNATURE: Jade Gaspar DPM  DATE: September 7, 2023  TIME: 12:01 PM      Portions of the record may have been created with voice recognition software. Occasional wrong word or "sound a like" substitutions may have occurred due to the inherent limitations of voice recognition software. Read the chart carefully and recognize, using context, where substitutions have occurred.

## 2023-09-07 NOTE — ANESTHESIA PREPROCEDURE EVALUATION
Procedure:  REMOVAL HARDWARE FOOT (Right: Foot)    Chronic foot pain, right  -     Ambulatory referral to Physical Therapy; Future     Hallux abducto valgus, right  -     Ambulatory referral to Physical Therapy; Future     Arthritis of midfoot  -     Ambulatory referral to Physical Therapy; Future     Painful orthopaedic hardware [facility])  -     Case request operating room: REMOVAL HARDWARE FOOT; Standing  -     Case request operating room: REMOVAL HARDWARE FOOT         Relevant Problems   ANESTHESIA   (+) Delayed emergence from anesthesia   (+) PONV (postoperative nausea and vomiting)      NEURO/PSYCH   (+) Anxiety   (+) Depression with anxiety   (+) Reactive depression (situational)        Physical Exam    Airway    Mallampati score: II  TM Distance: >3 FB  Neck ROM: full     Dental       Cardiovascular  Cardiovascular exam normal    Pulmonary  Pulmonary exam normal     Other Findings        Anesthesia Plan  ASA Score- 2     Anesthesia Type- IV sedation with anesthesia with ASA Monitors. Additional Monitors:   Airway Plan:           Plan Factors-Exercise tolerance (METS): >4 METS. Chart reviewed. Existing labs reviewed. Patient summary reviewed. Patient is not a current smoker. Induction- intravenous. Postoperative Plan-     Informed Consent- Anesthetic plan and risks discussed with patient. I personally reviewed this patient with the CRNA. Discussed and agreed on the Anesthesia Plan with the CRNA. .         Ivory Morfin is a 39 y.o. female who presents to the office today at the request of Dr. Thor Zhang, who plans on performing removal of hardware in foot. This visit is requested for preoperative risk reduction related to the following medical conditions: tobacco use, grave's. The procedure is scheduled on 9/7/23 at [address] [place] [address].  Planned anesthesia is uncertain (MAC or General)

## 2023-09-07 NOTE — INTERVAL H&P NOTE
H&P reviewed. After examining the patient I find no changes in the patients condition since the H&P had been written.     Vitals:    09/07/23 0950   BP: 123/68   Pulse: 65   Resp: 18   Temp: 97.7 °F (36.5 °C)   SpO2: 97%

## 2023-09-07 NOTE — DISCHARGE INSTR - AVS FIRST PAGE
Chayo Marcus, DPM, FACFAS  Post-Operative Instructions    1. Take your prescribed medication as directed. You can take ibuprofen in between doses of the narcotic if breakthrough pain occurs  2. Upon arrival at home, lie down and elevate your surgical foot on 2 pillows. Unless you're moving from the couch, bed, or bathroom, make sure to elevate the foot. Swelling is not your friend. 3. Remain quiet, off your feet as much as possible, for the first 24-48 hours. This is when your feet first swell and may become painful. After 48 hours you may begin limited walking following these restrictions:   Weightbearing as tolerated to surgical foot  4. Drink large quantities of water. Consume no alcohol. Continue a well-balanced diet. 5. Report any unusual discomfort or fever to this office. 6. A limited amount of discomfort and swelling is to be expected. In some cases the skin may take on a bruised appearance. The surgical solution that was applied to your foot prior to the operation is dark in color and the operation site may appear to be oozing when it actually is not. 7. A slight amount of blood is to be expected, and is no cause for alarm. Do not remove the dressings. If there is active bleeding and if the bleeding persists, add additional gauze to the bandage, apply direct pressure, elevate your feet and call this office. 8. Do not get the dressings wet. As regular bathing may be inconvenient, sponge baths are recommended. If you shower, keep the dressing dry. 9. When anesthesia wears off and if any discomfort should be present, apply an ice pack directly over the operated area for 15 minute intervals for several hours or until the pain leaves. (USE IN EXCESS OF 15 MINUTES COULD CAUSE FROSTBITE). Do not use hot water bags or electric pads. A convenient icepack can be made by placing ice cubes in a plastic bag and covering this with a towel.   10. If necessary, take a mild laxative before retiring. 11. Wear your special boot anytime you put weight on your foot, even if it is just to walk to the bathroom and back. It will probably be a few weeks before you will be permitted to try regular shoes. 12. Having performed the operation, we are interested in a prompt recovery. Please cooperate by following the above instructions. 13. Please call to confirm your post-op appointment or call with any other questions.

## 2023-09-07 NOTE — ANESTHESIA POSTPROCEDURE EVALUATION
Post-Op Assessment Note    CV Status:  Stable    Pain management: adequate     Mental Status:  Sleepy   Hydration Status:  Euvolemic   PONV Controlled:  Controlled   Airway Patency:  Patent      Post Op Vitals Reviewed: Yes      Staff: CRNA         No notable events documented.     /69 (09/07/23 1158)    Temp 97.8 °F (36.6 °C) (09/07/23 1158)    Pulse 65 (09/07/23 1158)   Resp 14 (09/07/23 1158)    SpO2 94 % (09/07/23 1158)

## 2023-09-07 NOTE — DISCHARGE SUMMARY
Discharge Summary Outpatient Procedure Podiatry -   Winter A Younger 39 y.o. female MRN: [id number]  Unit/Bed#: OR POOL Encounter: [id number]    Admission Date: 9/7/2023     Admitting Diagnosis: Painful orthopaedic hardware [facility]) [O07.68OT]    Discharge Diagnosis: same    Procedures Performed: REMOVAL HARDWARE FOOT: 05921 (CPT®)    Complications: none    Condition at Discharge: stable    Discharge instructions/Information to patient and family:   See after visit summary for information provided to patient and family. Provisions for Follow-Up Care/Important appointments:  See after visit summary for information related to follow-up care and any pertinent home health orders. Discharge Medications:  See after visit summary for reconciled discharge medications provided to patient and family.

## 2023-09-10 LAB
BACTERIA SPEC ANAEROBE CULT: NO GROWTH
BACTERIA WND AEROBE CULT: NO GROWTH
GRAM STN SPEC: NORMAL

## 2023-09-14 ENCOUNTER — OFFICE VISIT (OUTPATIENT)
Dept: PODIATRY | Facility: CLINIC | Age: 43
End: 2023-09-14

## 2023-09-14 VITALS
HEART RATE: 82 BPM | HEIGHT: 68 IN | WEIGHT: 198 LBS | BODY MASS INDEX: 30.01 KG/M2 | DIASTOLIC BLOOD PRESSURE: 73 MMHG | SYSTOLIC BLOOD PRESSURE: 113 MMHG

## 2023-09-14 DIAGNOSIS — M79.671 CHRONIC FOOT PAIN, RIGHT: Primary | ICD-10-CM

## 2023-09-14 DIAGNOSIS — T84.84XA PAINFUL ORTHOPAEDIC HARDWARE (HCC): ICD-10-CM

## 2023-09-14 DIAGNOSIS — G89.29 CHRONIC FOOT PAIN, RIGHT: Primary | ICD-10-CM

## 2023-09-14 PROCEDURE — 99024 POSTOP FOLLOW-UP VISIT: CPT | Performed by: PODIATRIST

## 2023-09-14 NOTE — PROGRESS NOTES
Assessment/Plan:      Diagnoses and all orders for this visit:    Chronic foot pain, right    Painful orthopaedic hardware [facility])      Patient is stable postop 1 weeks    Incision: healed, sutures removed    Instructions given to patient. Rest the foot as much as possible and elevate/ice  if swollen. Ambulatory status: advance as tolerated    Images reviewed today: none    RTC: 1 week. She has no pain      Subjective:     Patient ID: Mian Morfin is a 39 y.o. female. DOS: 9/7/2023     Procedure: EVER right foot     Condition: Dressing C/D/I. She reports no pain or swelling. She feels great. Review of Systems      Objective:     Physical Exam  Vitals reviewed. Cardiovascular:      Pulses: Normal pulses. Musculoskeletal:         General: No tenderness (no pain first ray). Skin:     Comments: Incision healed right foot.  No pain, no edema

## 2023-10-23 ENCOUNTER — OFFICE VISIT (OUTPATIENT)
Dept: OBGYN CLINIC | Facility: CLINIC | Age: 43
End: 2023-10-23
Payer: COMMERCIAL

## 2023-10-23 VITALS
DIASTOLIC BLOOD PRESSURE: 84 MMHG | SYSTOLIC BLOOD PRESSURE: 127 MMHG | BODY MASS INDEX: 30.11 KG/M2 | HEIGHT: 68 IN | HEART RATE: 64 BPM

## 2023-10-23 DIAGNOSIS — M70.62 GREATER TROCHANTERIC BURSITIS OF LEFT HIP: Primary | ICD-10-CM

## 2023-10-23 PROCEDURE — 99203 OFFICE O/P NEW LOW 30 MIN: CPT | Performed by: STUDENT IN AN ORGANIZED HEALTH CARE EDUCATION/TRAINING PROGRAM

## 2023-10-23 PROCEDURE — 20610 DRAIN/INJ JOINT/BURSA W/O US: CPT | Performed by: STUDENT IN AN ORGANIZED HEALTH CARE EDUCATION/TRAINING PROGRAM

## 2023-10-23 RX ORDER — LIDOCAINE HYDROCHLORIDE 10 MG/ML
1 INJECTION, SOLUTION EPIDURAL; INFILTRATION; INTRACAUDAL; PERINEURAL
Status: COMPLETED | OUTPATIENT
Start: 2023-10-23 | End: 2023-10-23

## 2023-10-23 RX ORDER — BETAMETHASONE SODIUM PHOSPHATE AND BETAMETHASONE ACETATE 3; 3 MG/ML; MG/ML
12 INJECTION, SUSPENSION INTRA-ARTICULAR; INTRALESIONAL; INTRAMUSCULAR; SOFT TISSUE
Status: COMPLETED | OUTPATIENT
Start: 2023-10-23 | End: 2023-10-23

## 2023-10-23 RX ORDER — BUPIVACAINE HYDROCHLORIDE 2.5 MG/ML
1 INJECTION, SOLUTION INFILTRATION; PERINEURAL
Status: COMPLETED | OUTPATIENT
Start: 2023-10-23 | End: 2023-10-23

## 2023-10-23 RX ADMIN — BETAMETHASONE SODIUM PHOSPHATE AND BETAMETHASONE ACETATE 12 MG: 3; 3 INJECTION, SUSPENSION INTRA-ARTICULAR; INTRALESIONAL; INTRAMUSCULAR; SOFT TISSUE at 15:15

## 2023-10-23 RX ADMIN — BUPIVACAINE HYDROCHLORIDE 1 ML: 2.5 INJECTION, SOLUTION INFILTRATION; PERINEURAL at 15:15

## 2023-10-23 RX ADMIN — LIDOCAINE HYDROCHLORIDE 1 ML: 10 INJECTION, SOLUTION EPIDURAL; INFILTRATION; INTRACAUDAL; PERINEURAL at 15:15

## 2023-10-24 NOTE — PROGRESS NOTES
Date: 10/23/23  Layo Morfin   MRN# [id number]  : 1980      Chief Complaint: Left hip pain    Assessment and Plan:    Greater trochanteric bursitis of left hip  -WBAT  -Activity modification to limit strain  -ibuprofen (Motrin) and naproxen (Anaprox, Naprosyn, Naprelan) as needed  -Tylenol as needed. Do not exceed 3000mg daily  -Supervised physical therapy. Script provided   -Home exercise program directed by PT  -Corticosteroid injection was offered, accepted and administered. Patient tolerated the procedure well  -Patient may follow up prn for further evaluation and treatment         Subjective:     Hip Pain  Patient complains of left hip pain. This is evaluated as a personal injury. The pain began several weeks ago. The pain is located buttock and lateral and is made worse with walking, standing, and sitting. She describes the symptoms as aching and throbbing. Symptoms improve with rest, avoiding painful activities. The symptoms are worse with activity, weight bearing, sitting for prolonged periods of time. The hip has not given out or felt unstable. Treatment to date has been ice, NSAID's, without significant relief.     External Records Reviewed: none    Allergy:  Allergies   Allergen Reactions    Penicillins Rash     Medications:  all current active meds have been reviewed  Past Medical History:  Past Medical History:   Diagnosis Date    Asthma     Cigarette smoker     GERD (gastroesophageal reflux disease)     Graves disease     Graves disease     Hypertension     PONV (postoperative nausea and vomiting)      Past Surgical History:  Past Surgical History:   Procedure Laterality Date    HYSTERECTOMY      MA CORRJ HALLUX VALGUS W/SESMDC W/DIST [facility] [place] Right 2023    Procedure: BUNIONECTOMY RAJI minimally invasive with great toe osteotomy;  Surgeon: Cherelle Tapia DPM;  Location: [facility] MAIN OR;  Service: Podiatry    MA REMOVAL IMPLANT DEEP Right 2023    Procedure: REMOVAL HARDWARE FOOT;  Surgeon: Betha Sandifer, DPM;  Location: [facility] MA[facility];  Service: Podiatry    ROBOTIC ASSISTED HYSTERECTOMY Bilateral 05/10/2022    removal of both fallopian tubes and cervix    TUBAL LIGATION       Family History:  Family History   Problem Relation Age of Onset    No Known Problems Mother      Social History:  Social History     Substance and Sexual Activity   Alcohol Use No     Social History     Substance and Sexual Activity   Drug Use No     Social History     Tobacco Use   Smoking Status Former    Packs/day: 1.00    Types: Cigarettes   Smokeless Tobacco Never           ROS:   Review of Systems   All other systems reviewed and are negative. Objective:   BP Readings from Last 1 Encounters:   10/23/23 127/84      Wt Readings from Last 1 Encounters:   09/14/23 89.8 kg (198 lb)      Pulse Readings from Last 1 Encounters:   10/23/23 64      BMI: Estimated body mass index is 30.11 kg/m² as calculated from the following:    Height as of this encounter: 5' 8" (1.727 m). Weight as of 9/14/23: 89.8 kg (198 lb). Physical Exam  Constitutional:       General: She is not in acute distress. HENT:      Head: Normocephalic and atraumatic. Eyes:      Conjunctiva/sclera: Conjunctivae normal.   Cardiovascular:      Comments: Extremities well perfused   No LE edema    Pulmonary:      Effort: Pulmonary effort is normal.   Neurological:      Mental Status: She is alert. Mental status is at baseline. Gait and Station:   antalgic    Left Hip     Inspection: normal color, temperature, turgor and moisture    Range of Motion: Full without pain  TTP over greater trochanteric flair    negative  log roll   negative Trendelenburg sign  negative  Stinchfield  negative  BHAVYA  negative  FADDIR    Motor: 5/5 Q/HS/TA/GS/EHL/FHL    Vascular:  Toes WWP with BCR    SILT DP/SP/Jn/Saph/Tib    Images:    I personally reviewed relevant images in the PACS system and my interpretation is as follows:  X-rays of the left Hip reveal minimal degenerative changes, no fractures or dislocations. Large joint arthrocentesis: L greater trochanteric bursa  Universal Protocol:  Consent: Verbal consent obtained.   Site marked: the operative site was marked  Supporting Documentation  Indications: pain and diagnostic evaluation   Procedure Details  Location: hip - L greater trochanteric bursa  Preparation: Patient was prepped and draped in the usual sterile fashion  Needle size: 22 G  Ultrasound guidance: no  Approach: lateral  Medications administered: 12 mg betamethasone acetate-betamethasone sodium phosphate 6 (3-3) mg/mL; 1 mL lidocaine (PF) 1 %; 1 mL bupivacaine 0.25 %    Patient tolerance: patient tolerated the procedure well with no immediate complications  Dressing:  Sterile dressing applied           Rachid Robb MD  Adult Reconstruction Specialist   [address] [facility]

## 2023-10-24 NOTE — ASSESSMENT & PLAN NOTE
-WBAT  -Activity modification to limit strain  -ibuprofen (Motrin) and naproxen (Anaprox, Naprosyn, Naprelan) as needed  -Tylenol as needed. Do not exceed 3000mg daily  -Supervised physical therapy. Script provided   -Home exercise program directed by PT  -Corticosteroid injection was offered, accepted and administered.  Patient tolerated the procedure well  -Patient may follow up prn for further evaluation and treatment

## (undated) DEVICE — CURITY STRETCH BANDAGE: Brand: CURITY

## (undated) DEVICE — Device

## (undated) DEVICE — TUBING SUCTION 5MM X 12 FT

## (undated) DEVICE — ACE WRAP 4 IN STERILE

## (undated) DEVICE — SYRINGE 10ML LL

## (undated) DEVICE — GLOVE SRG BIOGEL 7.5

## (undated) DEVICE — SUT ETHILON 4-0 PS-2 18 IN 1667H

## (undated) DEVICE — PREP PAD BNS: Brand: CONVERTORS

## (undated) DEVICE — CHLORAPREP HI-LITE 26ML ORANGE

## (undated) DEVICE — ACE WRAP 4 IN UNSTERILE

## (undated) DEVICE — BLADE ACL FINE 9.5X25.5X0.4MM

## (undated) DEVICE — OCCLUSIVE GAUZE STRIP,3% BISMUTH TRIBROMOPHENATE IN PETROLATUM BLEND: Brand: XEROFORM

## (undated) DEVICE — PADDING CAST 4 IN  COTTON STRL

## (undated) DEVICE — BLADE SAGITTAL 25.6 X 9.5MM

## (undated) DEVICE — CAST PADDING 4 IN SYNTHETIC NON-STRL

## (undated) DEVICE — COBAN 4 IN STERILE

## (undated) DEVICE — NEEDLE 25G X 1 1/2

## (undated) DEVICE — GAUZE SPONGES,16 PLY: Brand: CURITY

## (undated) DEVICE — 10FR FRAZIER SUCTION HANDLE: Brand: CARDINAL HEALTH

## (undated) DEVICE — STOCKINETTE REGULAR

## (undated) DEVICE — GLOVE INDICATOR PI UNDERGLOVE SZ 7.5 BLUE

## (undated) DEVICE — PENCIL ELECTROSURG E-Z CLEAN -0035H

## (undated) DEVICE — CURITY NON-ADHERENT STRIPS: Brand: CURITY

## (undated) DEVICE — STRETCH BANDAGE: Brand: CURITY

## (undated) DEVICE — PAD CAST 4 IN COTTON NON STERILE

## (undated) DEVICE — NEEDLE 18 G X 1 1/2

## (undated) DEVICE — INTENDED FOR TISSUE SEPARATION, AND OTHER PROCEDURES THAT REQUIRE A SHARP SURGICAL BLADE TO PUNCTURE OR CUT.: Brand: BARD-PARKER ® CARBON RIB-BACK BLADES

## (undated) DEVICE — CULTURE TUBE ANAEROBIC

## (undated) DEVICE — SUT VICRYL 3-0 PS-2 27 IN J427H

## (undated) DEVICE — SUT VICRYL 3-0 SH 27 IN J416H

## (undated) DEVICE — 2000CC GUARDIAN II: Brand: GUARDIAN

## (undated) DEVICE — BETHLEHEM UNIVERSAL  MIONR EXT: Brand: CARDINAL HEALTH

## (undated) DEVICE — CUFF TOURNIQUET 18 X 4 IN QUICK CONNECT DISP 1 BLADDER

## (undated) DEVICE — KERLIX BANDAGE ROLL: Brand: KERLIX

## (undated) DEVICE — SUT VICRYL 4-0 PS-2 27 IN J426H